# Patient Record
Sex: FEMALE | Race: WHITE
[De-identification: names, ages, dates, MRNs, and addresses within clinical notes are randomized per-mention and may not be internally consistent; named-entity substitution may affect disease eponyms.]

---

## 2017-02-24 ENCOUNTER — HOSPITAL ENCOUNTER (OUTPATIENT)
Dept: HOSPITAL 47 - LABWHC1 | Age: 82
Discharge: HOME | End: 2017-02-24
Payer: MEDICARE

## 2017-02-24 DIAGNOSIS — N39.0: Primary | ICD-10-CM

## 2017-02-24 LAB
PARTICLE COUNT: 1363
PH UR: 5 [PH] (ref 5–8)
RBC UR QL: 1 /HPF (ref 0–5)
SP GR UR: 1.02 (ref 1–1.03)
SQUAMOUS UR QL AUTO: 2 /HPF (ref 0–4)
UA BILLING (MACRO VS. MICRO): (no result)
UROBILINOGEN UR QL STRIP: <2 MG/DL (ref ?–2)
WBC #/AREA URNS HPF: 6 /HPF (ref 0–5)

## 2017-02-24 PROCEDURE — 87086 URINE CULTURE/COLONY COUNT: CPT

## 2017-02-24 PROCEDURE — 81001 URINALYSIS AUTO W/SCOPE: CPT

## 2017-08-17 ENCOUNTER — HOSPITAL ENCOUNTER (INPATIENT)
Dept: HOSPITAL 47 - 3SUR | Age: 82
LOS: 1 days | Discharge: HOME | DRG: 641 | End: 2017-08-18
Payer: MEDICARE

## 2017-08-17 VITALS — BODY MASS INDEX: 26.2 KG/M2

## 2017-08-17 DIAGNOSIS — E11.9: ICD-10-CM

## 2017-08-17 DIAGNOSIS — Z98.49: ICD-10-CM

## 2017-08-17 DIAGNOSIS — K57.90: ICD-10-CM

## 2017-08-17 DIAGNOSIS — Z95.5: ICD-10-CM

## 2017-08-17 DIAGNOSIS — I10: ICD-10-CM

## 2017-08-17 DIAGNOSIS — Z82.49: ICD-10-CM

## 2017-08-17 DIAGNOSIS — F02.80: ICD-10-CM

## 2017-08-17 DIAGNOSIS — R15.9: ICD-10-CM

## 2017-08-17 DIAGNOSIS — K58.0: ICD-10-CM

## 2017-08-17 DIAGNOSIS — J45.909: ICD-10-CM

## 2017-08-17 DIAGNOSIS — Z79.02: ICD-10-CM

## 2017-08-17 DIAGNOSIS — Z79.899: ICD-10-CM

## 2017-08-17 DIAGNOSIS — Z79.1: ICD-10-CM

## 2017-08-17 DIAGNOSIS — E03.9: ICD-10-CM

## 2017-08-17 DIAGNOSIS — R29.6: ICD-10-CM

## 2017-08-17 DIAGNOSIS — K44.9: ICD-10-CM

## 2017-08-17 DIAGNOSIS — Z88.5: ICD-10-CM

## 2017-08-17 DIAGNOSIS — R10.9: ICD-10-CM

## 2017-08-17 DIAGNOSIS — I25.10: ICD-10-CM

## 2017-08-17 DIAGNOSIS — M19.90: ICD-10-CM

## 2017-08-17 DIAGNOSIS — G30.9: ICD-10-CM

## 2017-08-17 DIAGNOSIS — E86.0: Primary | ICD-10-CM

## 2017-08-17 DIAGNOSIS — Z91.81: ICD-10-CM

## 2017-08-17 DIAGNOSIS — Z79.82: ICD-10-CM

## 2017-08-17 DIAGNOSIS — Z82.5: ICD-10-CM

## 2017-08-17 DIAGNOSIS — Z88.0: ICD-10-CM

## 2017-08-17 DIAGNOSIS — E78.00: ICD-10-CM

## 2017-08-17 PROCEDURE — 85379 FIBRIN DEGRADATION QUANT: CPT

## 2017-08-17 PROCEDURE — 85025 COMPLETE CBC W/AUTO DIFF WBC: CPT

## 2017-08-17 PROCEDURE — 76700 US EXAM ABDOM COMPLETE: CPT

## 2017-08-17 PROCEDURE — 84484 ASSAY OF TROPONIN QUANT: CPT

## 2017-08-17 PROCEDURE — 83690 ASSAY OF LIPASE: CPT

## 2017-08-17 PROCEDURE — 93005 ELECTROCARDIOGRAM TRACING: CPT

## 2017-08-17 PROCEDURE — 74160 CT ABDOMEN W/CONTRAST: CPT

## 2017-08-17 PROCEDURE — 83880 ASSAY OF NATRIURETIC PEPTIDE: CPT

## 2017-08-17 PROCEDURE — 71020: CPT

## 2017-08-17 PROCEDURE — 80053 COMPREHEN METABOLIC PANEL: CPT

## 2017-08-17 PROCEDURE — 70450 CT HEAD/BRAIN W/O DYE: CPT

## 2017-08-17 RX ADMIN — METOPROLOL TARTRATE SCH MG: 25 TABLET, FILM COATED ORAL at 20:52

## 2017-08-17 RX ADMIN — LISINOPRIL SCH MG: 10 TABLET ORAL at 20:52

## 2017-08-17 RX ADMIN — MEMANTINE HYDROCHLORIDE SCH MG: 10 TABLET ORAL at 20:52

## 2017-08-17 NOTE — XR
EXAMINATION TYPE: XR chest 2V

 

DATE OF EXAM: 8/17/2017

 

COMPARISON: Prior chest x-ray 12/26/2016

 

HISTORY: Dyspnea

 

TECHNIQUE:  Frontal and lateral views of the chest are obtained.

 

FINDINGS:  There is no focal air space opacity, pleural effusion, or pneumothorax seen.  The cardiac 
silhouette size is stable, heart is enlarged. The patient is rotated.  Prominent lung volumes may be 
indicative of COPD. Probable coronary artery calcification or stent noted. The osseous structures are
 intact.

 

IMPRESSION:  No acute cardiopulmonary process. Stable cardiomegaly.

## 2017-08-17 NOTE — US
EXAMINATION TYPE: US abdomen complete

 

DATE OF EXAM: 8/17/2017

 

COMPARISON: NONE

 

CLINICAL HISTORY: abdominal pain.

 

EXAM MEASUREMENTS:

 

Liver Length:  13.4 cm   

Gallbladder Wall:  0.1 cm   

CBD:  0.6 cm

Spleen:  9.3 cm   

Right Kidney:  10.5 x 4.2 x 3.3 cm 

Left Kidney:  10.4 x 5.2 x 4.9 cm   

 

 

 

Pancreas:  wnl tail obscured by bowel gas

Liver:  partially Obscured by overlying bowel gas  

Gallbladder:  wnl

**Evidence for sonographic Hastings's sign:  no

CBD: Within normal limits of this age group

Spleen:  wnl   

Right Kidney:  wnl   

Left Kidney:  mid pole cyst =0.9 x 1.0 x 0.8 cm  and the kidneys show no hydronephrosis, cortical med
ullary differentiation is maintained.

Upper IVC:  wnl  as visualized

Abd Aorta:  wnl as visualized

 

There is no ascites.

 

 

IMPRESSION: Partially limited exam. No significant abnormalities evident.

## 2017-08-18 VITALS — SYSTOLIC BLOOD PRESSURE: 143 MMHG | DIASTOLIC BLOOD PRESSURE: 82 MMHG | HEART RATE: 97 BPM | TEMPERATURE: 98.4 F

## 2017-08-18 VITALS — RESPIRATION RATE: 12 BRPM

## 2017-08-18 LAB
ALP SERPL-CCNC: 108 U/L (ref 38–126)
ALT SERPL-CCNC: 22 U/L (ref 9–52)
ANION GAP SERPL CALC-SCNC: 10 MMOL/L
AST SERPL-CCNC: 26 U/L (ref 14–36)
BASOPHILS # BLD AUTO: 0 K/UL (ref 0–0.2)
BASOPHILS NFR BLD AUTO: 0 %
BUN SERPL-SCNC: 19 MG/DL (ref 7–17)
CALCIUM SPEC-MCNC: 8.3 MG/DL (ref 8.4–10.2)
CH: 27.6
CHCM: 32.8
CHLORIDE SERPL-SCNC: 113 MMOL/L (ref 98–107)
CO2 SERPL-SCNC: 23 MMOL/L (ref 22–30)
EOSINOPHIL # BLD AUTO: 0.2 K/UL (ref 0–0.7)
EOSINOPHIL NFR BLD AUTO: 3 %
ERYTHROCYTE [DISTWIDTH] IN BLOOD BY AUTOMATED COUNT: 4.68 M/UL (ref 3.8–5.4)
ERYTHROCYTE [DISTWIDTH] IN BLOOD: 13.6 % (ref 11.5–15.5)
GLUCOSE BLD-MCNC: 112 MG/DL (ref 75–99)
GLUCOSE BLD-MCNC: 145 MG/DL (ref 75–99)
GLUCOSE BLD-MCNC: 85 MG/DL (ref 75–99)
GLUCOSE SERPL-MCNC: 81 MG/DL (ref 74–99)
HCT VFR BLD AUTO: 39.5 % (ref 34–46)
HDW: 2.43
HGB BLD-MCNC: 13.7 GM/DL (ref 11.4–16)
LUC NFR BLD AUTO: 2 %
LYMPHOCYTES # SPEC AUTO: 3 K/UL (ref 1–4.8)
LYMPHOCYTES NFR SPEC AUTO: 34 %
MCH RBC QN AUTO: 29.2 PG (ref 25–35)
MCHC RBC AUTO-ENTMCNC: 34.6 G/DL (ref 31–37)
MCV RBC AUTO: 84.4 FL (ref 80–100)
MONOCYTES # BLD AUTO: 0.5 K/UL (ref 0–1)
MONOCYTES NFR BLD AUTO: 5 %
NEUTROPHILS # BLD AUTO: 4.9 K/UL (ref 1.3–7.7)
NEUTROPHILS NFR BLD AUTO: 56 %
NON-AFRICAN AMERICAN GFR(MDRD): 51
POTASSIUM SERPL-SCNC: 4.2 MMOL/L (ref 3.5–5.1)
PROT SERPL-MCNC: 5.8 G/DL (ref 6.3–8.2)
SODIUM SERPL-SCNC: 146 MMOL/L (ref 137–145)
WBC # BLD AUTO: 0.14 10*3/UL
WBC # BLD AUTO: 8.7 K/UL (ref 3.8–10.6)
WBC (PEROX): 8.56

## 2017-08-18 RX ADMIN — LISINOPRIL SCH MG: 10 TABLET ORAL at 07:04

## 2017-08-18 RX ADMIN — MEMANTINE HYDROCHLORIDE SCH MG: 10 TABLET ORAL at 07:05

## 2017-08-18 RX ADMIN — METOPROLOL TARTRATE SCH MG: 25 TABLET, FILM COATED ORAL at 07:05

## 2017-08-18 NOTE — P.GSCN
History of Present Illness


Consult date: 17


Reason for Consult: 





Abdominal pain


History of present illness: 





81-year-old female is being seen for a surgical evaluation at the request of 

the attending for a chief complaint of diffuse abdominal pain with frequent 

stooling.  Patient is a poor past medical historian.  Has no  medical history 

recall is not able to provide any medical history or give information about 

current event patient does have a history of Alzheimer with dementia.  Health 

history has been obtained by the 2 daughters at the bedside.  The 2 daughters 

state the last 5 weeks they have been staying with her mother the house while 

her father recuperates from hernia repair.  The 2 daughters state that they 

have noticed over the last several weeks the patient would be incontinent of 

stool especially at night.  They state that she may have several loose watery 

stools.  No blood noted in the stool   


Patient currently is resting comfortably in bed and palpating the abdominal 

wall does not elicit any facial grimacing.  Questioning patient if patient is 

having any abdominal pain the patient stating no


  According to the daughter patient has had a poor appetite for the past 

several weeks   not aware of the patient's past surgical history or medical 

history   when questioning the daughters also state  not certain if the mother 

has ever had a colonoscopy.  Reviewing the prior computerized record in 

2016 past surgical history indicated patient has had cataract surgery, 

colonoscopy, tonsillectomy.  Reviewing the computerized record in 2014 patient did have a heart catheterization involving a single-vessel the LAD 

done by Dr. Mitchell





Review of Systems





Not able to adequately obtain





Past Medical History


Past Medical History: Dementia, Diabetes Mellitus, Hypertension, Memory 

Impairment, Osteoarthritis (OA)


History of Any Multi-Drug Resistant Organisms: None Reported


Past Surgical History: Tonsillectomy


Additional Past Surgical History / Comment(s): cataract surgery, colonoscopy, 

heart stent 


Past Anesthesia/Blood Transfusion Reactions: No Reported Reaction


Past Psychological History: No Psychological Hx Reported


Smoking Status: Never smoker


Past Alcohol Use History: Rare


Past Drug Use History: None Reported





- Past Family History


  ** Mother


Family Medical History: Asthma





  ** Father


Family Medical History: Myocardial Infarction (MI)


Additional Family Medical History / Comment(s):  at 59





Medications and Allergies


 Home Medications











 Medication  Instructions  Recorded  Confirmed  Type


 


Donepezil [Aricept] 10 mg PO HS 14 History


 


Focus-Macular 1 tab PO BID 14 History


 


Levothyroxine Sodium [Synthroid] 25 mcg PO DAILY 14 History


 


Aspirin EC [Ecotrin Low Dose] 81 mg PO DAILY 16 History


 


Cholecalciferol [Vitamin D3] 2,000 unit PO DAILY 16 History


 


Diclofenac Sodium [Voltaren] 50 mg PO BID 16 History


 


Memantine [Namenda] 10 mg PO BID 16 History


 


Montelukast [Singulair] 10 mg PO DAILY 16 History


 


Multivitamin [Multivitamins Adult 2 tab PO DAILY 17 History





Gummies]    











 Allergies











Allergy/AdvReac Type Severity Reaction Status Date / Time


 


Penicillins Allergy  Unknown Verified 16 14:35


 


propoxyphene HCl Allergy  Confusion Verified 16 14:35





[From Darvon]     














Surgical - Exam


 Vital Signs











Temp Pulse Resp BP Pulse Ox


 


 97 F L  75   16   197/81   95 


 


 17 12:06  17 12:06  17 12:06  17 12:06  17 12:06














GENERAL APPEARANCE: 81-year-old female patient is alert, oriented to self and 

place able to identify daughter's in no acute distress.  Pleasant cooperative 


VITAL SIGNS: Reviewed 


HEENT: Head is normocephalic and atraumatic. Pupils are equal and reactive. The 

nares are patent. Oropharynx is clear without lesions.


NECK: Supple without lymphadenopathy. Traches midline.


HEART: S1, S2. Regular rate and rhythm.  Denying chest pain no murmur


LUNGS: No crackles or wheezes are heard.  Adequate air movement bilaterally


ABDOMEN: Soft, nontender, nondistended with good bowel sounds. No peritoneal 

signs. No palpable organomegaly or masses.  


EXTREMITIES: Normal skin color and turgor. No cyanosis, rash, ulceration, 

clubbing or edema. Radial pedal pulses are 2/4 bilaterally.











Results





- Labs





 17 06:03





 17 06:03


 Abnormal Lab Results - Last 24 Hours (Table)











  17 Range/Units





  06:03 12:18 


 


Sodium  146 H   (137-145)  mmol/L


 


Chloride  113 H   ()  mmol/L


 


BUN  19 H   (7-17)  mg/dL


 


POC Glucose (mg/dL)   145 H  (75-99)  mg/dL


 


Calcium  8.3 L   (8.4-10.2)  mg/dL


 


Total Protein  5.8 L   (6.3-8.2)  g/dL


 


Albumin  3.3 L   (3.5-5.0)  g/dL








 Diabetes panel











  17 Range/Units





  06:03 


 


Sodium  146 H  (137-145)  mmol/L


 


Potassium  4.2  (3.5-5.1)  mmol/L


 


Chloride  113 H  ()  mmol/L


 


Carbon Dioxide  23  (22-30)  mmol/L


 


BUN  19 H  (7-17)  mg/dL


 


Creatinine  1.04  (0.52-1.04)  mg/dL


 


Glucose  81  (74-99)  mg/dL


 


Calcium  8.3 L  (8.4-10.2)  mg/dL


 


AST  26  (14-36)  U/L


 


ALT  22  (9-52)  U/L


 


Alkaline Phosphatase  108  ()  U/L


 


Total Protein  5.8 L  (6.3-8.2)  g/dL


 


Albumin  3.3 L  (3.5-5.0)  g/dL








 Calcium panel











  17 Range/Units





  06:03 


 


Calcium  8.3 L  (8.4-10.2)  mg/dL


 


Albumin  3.3 L  (3.5-5.0)  g/dL








 Pituitary panel











  17 Range/Units





  06:03 


 


Sodium  146 H  (137-145)  mmol/L


 


Potassium  4.2  (3.5-5.1)  mmol/L


 


Chloride  113 H  ()  mmol/L


 


Carbon Dioxide  23  (22-30)  mmol/L


 


BUN  19 H  (7-17)  mg/dL


 


Creatinine  1.04  (0.52-1.04)  mg/dL


 


Glucose  81  (74-99)  mg/dL


 


Calcium  8.3 L  (8.4-10.2)  mg/dL








 Adrenal panel











  17 Range/Units





  06:03 


 


Sodium  146 H  (137-145)  mmol/L


 


Potassium  4.2  (3.5-5.1)  mmol/L


 


Chloride  113 H  ()  mmol/L


 


Carbon Dioxide  23  (22-30)  mmol/L


 


BUN  19 H  (7-17)  mg/dL


 


Creatinine  1.04  (0.52-1.04)  mg/dL


 


Glucose  81  (74-99)  mg/dL


 


Calcium  8.3 L  (8.4-10.2)  mg/dL


 


Total Bilirubin  0.6  (0.2-1.3)  mg/dL


 


AST  26  (14-36)  U/L


 


ALT  22  (9-52)  U/L


 


Alkaline Phosphatase  108  ()  U/L


 


Total Protein  5.8 L  (6.3-8.2)  g/dL


 


Albumin  3.3 L  (3.5-5.0)  g/dL














Assessment and Plan


Plan: 





Impression


Present on admission abdominal pain unclear etiology


Alzheimer's with dementia no behavior disturbance


Coronary artery disease with prior coronary stenting LAD 


History of frequent falls


CAT scan abdomen and pelvis done on  shows hiatal hernia 

diverticulosis with no evidence of diverticulitis


Present on admission a history of frequent loose stools possible irritable 

bowel syndrome


 hypertension








Plan


Check stool studies


No evidence of an acute abdomen


Dietary to instruct patient on a diverticulosis diet


Resume home meds as appropriate 


further surgical recommendations pending


DVT and GI prophylaxis


May benefit from a colonoscopy











The above impression and plan of care have been discussed and directed by 

signing physician. Susan Malhotra nurse practitioner acting as scribe for signing 

physician.

## 2017-08-18 NOTE — CONS
Mrs. Lali Zheng is a 81-year-old lady with a known diagnosis of CAD, 
previous LAD stenting performed by Dr. ZOILA Mitchell as well as dilatation of 
diagonal branch in 2014.  She has hypertension, hypothyroidism, bronchial 
asthma.  She was admitted to the hospital by Dr. Geremias Quiñones with complaints 
of diarrhea that was persistent and dehydration.  While she was here she became 
hypertensive and I was asked to see her in this regard.  I saw the patient and 
also talked to the family.  The patient at this time is in no distress, she is 
comfortable.  Denies chest pain.  Diarrhea has resolved.  She has no shortness 
of breath.  She is resting comfortably.  There is no EKG in the chart and I 
requested for an EKG to performed.  Her blood pressure which was elevated has 
come back to normal once her medications were resumed.  



She has a history of hypertension, bronchial asthma, hyperlipidemia, previous 
PCI.  



Her laboratory data suggests that her initial troponin was normal.  Her renal 
function is normal and electrolyte profile and hemoglobin are acceptable.  
Platelet count is 158.



PAST MEDICAL HISTORY: 

1.  Hypertension.

2.  Hypercholesterolemia.

3.  CAD with previous LAD stenting in 2014.

4.  History of nondescript abdominal pain on and off.

5.  History of bronchial asthma.



MEDICATIONS AT HOME:  Include Singulair, Lopressor, Namenda, Synthroid, Vasotec
, Plavix, aspirin 81 mg daily, atorvastatin.



ALLERGIES:  PENICILLIN AND DARVON.



On examination, blood pressure is 144/70, pulse rate is 68 per minute and 
regular.

HEENT:  Unremarkable.  Fundus was not examined by me.

NECK:  Supple.  There is no JVD.  I do not hear a carotid bruit.

HEART:  Reveals S1/S2 heard normally.  There is a soft systolic murmur left 
lower sternal border.

LUNGS:  Clear.

ABDOMEN:  Soft, nontender.

LOWER EXTREMITIES:  Reveal normal pulses.  No edema.

CENTRAL NERVOUS SYSTEM:  Normal.



EKG is pending.  



IMPRESSION:

1.  Patient is here with diarrhea but the diarrhea has resolved.  Electrolytes 
look quite alright.  There is no reflexion of any electrolyte imbalance at this 
time.

2.  Stable coronary artery disease with previous stenting.  No evidence of any 
angina.

3.  Hypertension which was not well controlled yesterday, appears to be under 
good control with resumption of medications.

4.  Bronchial asthma.

5.  Hypothyroidism.



RECOMMENDATIONS:  I am recommending that her current medical regimen is good. 
We can continue the same medications and she can be discharged whenever it is 
okay with Dr. Geremias Quiñones.  Her BP has been optimized.  Her medications 
including beta blocker and lisinopril have been resumed.  I am recommending 
that we discontinue Plavix and continue aspirin 81 mg daily and she can then be 
discharged.  I discussed my thoughts in detail with the patient.  



Thank you very much for the consult.
BERT

## 2017-08-18 NOTE — CT
EXAMINATION TYPE: CT abdomen w con

 

DATE OF EXAM: 8/18/2017

 

COMPARISON: NONE

 

INDICATION: Patient poor historian.  Patient has abdominal pain.

 

DLP: 398.8 mGycm, Automated exposure control for dose reduction was used.

 

CONTRAST:  80 mL of Visipaque 320. 

                        Study performed without Oral Contrast

 

TECHNIQUE: Axial images were obtained from above the diaphragm to the pubic rami in the axial plane a
t 5 mm thick sections.  Reconstructed images are reviewed on the computer in the coronal plane.  

 

FINDINGS:

 

Limited CT sections are obtained the lung bases.  Minimal areas of pneumonitis are at the lung bases 
which can be related to subsegmental atelectasis. Hiatal hernia is present. Some coronary artery calc
ification is not excluded..

 

CT ABDOMEN:

 

Liver: Normal

 

Spleen: Normal

 

Pancreas: Normal

 

Adrenal glands: The adrenal glands are normal.

 

Gallbladder: Normal  

 

Kidneys: No masses are evident. No hydronephrosis is present.   No cysts are present.  Delayed images
 were obtained through the kidneys, which remain unremarkable.

 

Aorta: Vascular calcification is within the aorta. 

 

Inferior vena cava: Normal.

 

Loops of bowel within the abdomen and pelvis are normal.     Studies without oral contrast limiting t
heir evaluation. Some distal descending colon diverticular changes are evident. Few scattered diverti
culi are within the transverse and ascending colon within the field-of-view.

 

IMPRESSIONS:

1.  Hiatal hernia.

2. Diverticulosis without acute diverticulitis within the visualized colon.

## 2017-08-18 NOTE — CT
EXAMINATION TYPE: CT brain wo con

 

DATE OF EXAM: 8/18/2017

 

COMPARISON: 12/26/2016

 

INDICATION: Patient poor historian.  History of dementia.  Rule out CVA.

 

DLP: 742.7 mGycm, Automated exposure control for dose reduction was used.

 

CONTRAST: None

 

CT of the brain is performed utilizing 3 mm thick sections through the posterior fossa and 3 mm thick
 sections through the remaining calvarium.  Study is performed within 24 hours of arrival to the hosp
ital. 

 

No abnormal hyperdensity is present to suggest an acute intracranial hemorrhage.

No mass lesion is evident.

No acute infarcts are evident. There is mild periventricular white matter hypodensity, likely on the 
basis of chronic white matter ischemic changes.

Ventricles and sulci are prominent for the patient age.  

Paranasal sinuses and mastoid air cells within the field-of-view are clear.

 

IMPRESSIONS:

1.   Atrophy with mild periventricular white matter ischemic changes, stable from December 2016

## 2017-09-14 ENCOUNTER — HOSPITAL ENCOUNTER (OUTPATIENT)
Dept: HOSPITAL 47 - ORWHC2ENDO | Age: 82
Discharge: HOME | End: 2017-09-14
Attending: SURGERY
Payer: MEDICARE

## 2017-09-14 VITALS — TEMPERATURE: 98 F

## 2017-09-14 VITALS — DIASTOLIC BLOOD PRESSURE: 74 MMHG | SYSTOLIC BLOOD PRESSURE: 126 MMHG | HEART RATE: 75 BPM

## 2017-09-14 VITALS — BODY MASS INDEX: 26.2 KG/M2

## 2017-09-14 VITALS — RESPIRATION RATE: 16 BRPM

## 2017-09-14 DIAGNOSIS — G30.9: ICD-10-CM

## 2017-09-14 DIAGNOSIS — M19.90: ICD-10-CM

## 2017-09-14 DIAGNOSIS — K20.9: ICD-10-CM

## 2017-09-14 DIAGNOSIS — F02.80: ICD-10-CM

## 2017-09-14 DIAGNOSIS — Z79.899: ICD-10-CM

## 2017-09-14 DIAGNOSIS — E07.9: ICD-10-CM

## 2017-09-14 DIAGNOSIS — Z79.82: ICD-10-CM

## 2017-09-14 DIAGNOSIS — I25.10: ICD-10-CM

## 2017-09-14 DIAGNOSIS — K57.30: ICD-10-CM

## 2017-09-14 DIAGNOSIS — K29.50: Primary | ICD-10-CM

## 2017-09-14 DIAGNOSIS — Z79.02: ICD-10-CM

## 2017-09-14 DIAGNOSIS — Z95.5: ICD-10-CM

## 2017-09-14 DIAGNOSIS — E78.5: ICD-10-CM

## 2017-09-14 DIAGNOSIS — I10: ICD-10-CM

## 2017-09-14 DIAGNOSIS — E11.9: ICD-10-CM

## 2017-09-14 DIAGNOSIS — K44.9: ICD-10-CM

## 2017-09-14 DIAGNOSIS — Z88.0: ICD-10-CM

## 2017-09-14 DIAGNOSIS — Z88.8: ICD-10-CM

## 2017-09-14 LAB — GLUCOSE BLD-MCNC: 109 MG/DL (ref 75–99)

## 2017-09-14 PROCEDURE — 88305 TISSUE EXAM BY PATHOLOGIST: CPT

## 2017-09-14 PROCEDURE — 43239 EGD BIOPSY SINGLE/MULTIPLE: CPT

## 2017-09-14 PROCEDURE — 88342 IMHCHEM/IMCYTCHM 1ST ANTB: CPT

## 2017-09-14 PROCEDURE — 45378 DIAGNOSTIC COLONOSCOPY: CPT

## 2017-09-14 NOTE — P.OP
Date of Procedure: 09/14/17


Preoperative Diagnosis: 


GERD





Diverticulitis


Postoperative Diagnosis: 


Antral gastritis





Large hiatal hernia





Esophagitis





Severe diverticulosis of sigmoid left colon


Procedure(s) Performed: 


EGD and colonoscopy


Anesthesia: MAC


Surgeon: Juan Wood


Pathology: other (Antrum, esophagus)


Condition: stable


Disposition: PACU


Description of Procedure: 


The patient's placed on the endoscopy table in the lateral position.  She 

received IV sedation.  The gastroscope placed oropharynx and passed in the 

esophagus and into the stomach.  The scope was then placed through the pylorus.

  The first second portion of the duodenum appeared normal.  The scope was then 

brought back the antrum is appeared mildly inflamed.  A biopsies performed.  

The scope was then retroflexed and the remainder of the stomach appeared 

normal.  There was a large hiatal hernia seen.  The GE junction was at 37 cm.  

The distal esophagus was mildly inflamed and a biopsies performed.  The 

proximal esophagus.  Normal.  The scope was withdrawn for patient.





Next digital rectal exam was performed which revealed no abnormalities.  The 

flexible colonoscope was then placed patient anus passed throughout the entire 

colon.  The ileocecal valve sutures.  The cecum, ascending and transverse colon 

appeared normal.  In the descending; there is severe diverticulosis.  There is 

known to back to diverticulitis.  The scope was then brought back the rectum 

and this appeared normal.  Scope was withdrawn for patient.

## 2017-09-14 NOTE — P.GSHP
History of Present Illness


H&P Date: 17


Chief Complaint: GERD, dysphagia, diverticulitis





This is a 81-year-old female who's had complaints of abdominal pain.  Her 

recent CAT scan shows evidence of a hiatal hernia and diverticulitis.  She 

presents today for colonoscopy.





Past Medical History


Past Medical History: Dementia, Diabetes Mellitus, Hyperlipidemia, Hypertension

, Memory Impairment, Osteoarthritis (OA), Thyroid Disorder


Additional Past Medical History / Comment(s): ALZHEIMER'S


History of Any Multi-Drug Resistant Organisms: None Reported


Past Surgical History: Heart Catheterization With Stent, Tonsillectomy


Additional Past Surgical History / Comment(s): cataract surgery, colonoscopy,


Past Anesthesia/Blood Transfusion Reactions: No Reported Reaction


Date of Last Stent Placement:: 


Smoking Status: Never smoker





- Past Family History


  ** Mother


Family Medical History: Asthma





  ** Father


Family Medical History: Deep Vein Thrombosis (DVT), Myocardial Infarction (MI)


Additional Family Medical History / Comment(s):  at 59





  ** Sister(s)


Family Medical History: Cancer


Additional Family Medical History / Comment(s): 2 SISTERS HAD CANCER





Medications and Allergies


 Home Medications











 Medication  Instructions  Recorded  Confirmed  Type


 


Donepezil [Aricept] 10 mg PO HS 14 History


 


Focus-Macular 1 tab PO BID 14 History


 


Levothyroxine Sodium [Synthroid] 25 mcg PO DAILY 14 History


 


Atorvastatin [Lipitor] 80 mg PO HS #30 tab 14 Rx


 


Clopidogrel [Plavix] 75 mg PO DAILY #30 tab 14 Rx


 


Enalapril [Vasotec] 5 mg PO BID #60 tab 14 Rx


 


Metoprolol Tartrate [Lopressor] 25 mg PO BID #60 tab 14 Rx


 


Nitroglycerin Sl Tabs [Nitrostat] 0.4 mg SUBLINGUAL Q5M PRN #25 tab 14 Rx


 


Aspirin EC [Ecotrin Low Dose] 81 mg PO DAILY 16 History


 


Cholecalciferol [Vitamin D3] 2,000 unit PO DAILY 16 History


 


Diclofenac Sodium [Voltaren] 50 mg PO BID 16 History


 


Montelukast [Singulair] 10 mg PO DAILY 16 History


 


Multivitamin [Multivitamins Adult 2 tab PO DAILY 17 History





Gummies]    











 Allergies











Allergy/AdvReac Type Severity Reaction Status Date / Time


 


Penicillins Allergy  Unknown Verified 17 08:22


 


propoxyphene HCl Allergy  Confusion Verified 17 08:22





[From Huong]     














Surgical - Exam


 Vital Signs











Temp Pulse Resp BP Pulse Ox


 


 98.0 F   56 L  16   158/58   99 


 


 17 08:48  17 08:48  17 08:48  17 08:48  17 08:48














- General


well developed, no distress





- Eyes


PERRL





- ENT


normal pinna





- Neck


no masses





- Respiratory


normal expansion





- Cardiovascular


Rhythm: regular





- Abdomen


Abdomen: soft, non tender





Assessment and Plan


Plan: 





History of.  Xylocaine diverticulitis.  We'll perform EGD and colonoscopy.

## 2017-10-06 ENCOUNTER — HOSPITAL ENCOUNTER (OUTPATIENT)
Dept: HOSPITAL 47 - RADMAMWWP | Age: 82
Discharge: HOME | End: 2017-10-06
Payer: MEDICARE

## 2017-10-06 DIAGNOSIS — Z12.31: Primary | ICD-10-CM

## 2017-10-09 NOTE — HP
HISTORY AND PHYSICAL



CHIEF COMPLAINT:

81-year-old, white female, history of coronary artery disease.  Previous LAD stenting

with hypertension, hypothyroidism, asthma.  She had diarrhea significant nature

persistent.  She was dehydrated and became hypertensive with acceleration and severe

dehydration.  At which time she was admitted to the hospital.  She has a history of

bronchial asthma,  dyslipidemia,  hypertension, coronary artery disease with stenting

in 2014, bronchial asthma.



HOME MEDICINES:

1. Singular.

2. Lopressor.

3. Namenda.

4. Synthroid.

5. Vasotec.

6. Plavix.

7. Aspirin.

8. Atorvastatin.



ALLERGIES:

PENICILLIN AND DARVON.



PHYSICAL EXAMINATION:

Blood pressure 144/70, pulse is 68 and regular.  HEENT:  Normocephalic, atraumatic.

Neck is supple.  No JVD.  Heart are S1, S2.  Lungs are clear.  Abdomen is soft,

nontender.  Extremities no cyanosis, clubbing, or edema.



EKG is pending.



ASSESSMENT:

1. Dehydration, diarrhea.

2. Dizziness secondary to dehydration.

3. Stable coronary artery disease with stents.

4. Hypertension.

5. Bronchial asthma.

6. Hypothyroidism.



PLAN:

IV fluid,  rehydration for dizziness and diarrhea will be assessed by surgeon or

Gastroenterology doctor.  Hypertension acceleration will be treated with beta blockers,

lisinopril and Cardiology consult for hypertension acceleration.





MMODL / IJN: [id number] / Job#: [id number]

## 2017-10-10 NOTE — MM
Reason for exam: screening  (asymptomatic).

Last mammogram was performed 1 year ago.



History:

Patient is postmenopausal.

Family history of breast cancer in aunt and breast cancer in sister at age 25.

2 benign excisional biopsies of the right breast.



Physical Findings:

A clinical breast exam by your physician is recommended on an annual basis and 

results should be correlated with mammographic findings.



MG Screening Mammo w CAD

Bilateral CC and MLO view(s) were taken.

Prior study comparison: September 29, 2016, bilateral MG 3d screening mammo w/cad.

August 20, 2015, bilateral MG screening mammo w CAD.

The breast tissue is almost entirely fat.  Bilateral benign secretory 

calcifications.  No significant changes when compared with prior studies.





ASSESSMENT: Negative, BI-RAD 1



RECOMMENDATION:

Routine screening mammogram of both breasts in 1 year.

## 2019-08-28 ENCOUNTER — HOSPITAL ENCOUNTER (INPATIENT)
Dept: HOSPITAL 47 - EC | Age: 84
LOS: 9 days | Discharge: NURSING, REHAB OR LONG-TERM CARE | DRG: 417 | End: 2019-09-06
Payer: MEDICARE

## 2019-08-28 VITALS — BODY MASS INDEX: 24.9 KG/M2

## 2019-08-28 DIAGNOSIS — D49.0: ICD-10-CM

## 2019-08-28 DIAGNOSIS — E11.65: ICD-10-CM

## 2019-08-28 DIAGNOSIS — Z80.9: ICD-10-CM

## 2019-08-28 DIAGNOSIS — Z95.5: ICD-10-CM

## 2019-08-28 DIAGNOSIS — K44.9: ICD-10-CM

## 2019-08-28 DIAGNOSIS — I21.4: ICD-10-CM

## 2019-08-28 DIAGNOSIS — Z79.899: ICD-10-CM

## 2019-08-28 DIAGNOSIS — K81.2: Primary | ICD-10-CM

## 2019-08-28 DIAGNOSIS — E78.5: ICD-10-CM

## 2019-08-28 DIAGNOSIS — Z82.5: ICD-10-CM

## 2019-08-28 DIAGNOSIS — K82.A2: ICD-10-CM

## 2019-08-28 DIAGNOSIS — Z82.49: ICD-10-CM

## 2019-08-28 DIAGNOSIS — F02.80: ICD-10-CM

## 2019-08-28 DIAGNOSIS — Z83.2: ICD-10-CM

## 2019-08-28 DIAGNOSIS — K82.1: ICD-10-CM

## 2019-08-28 DIAGNOSIS — N39.0: ICD-10-CM

## 2019-08-28 DIAGNOSIS — Z88.5: ICD-10-CM

## 2019-08-28 DIAGNOSIS — K57.90: ICD-10-CM

## 2019-08-28 DIAGNOSIS — I25.10: ICD-10-CM

## 2019-08-28 DIAGNOSIS — G30.9: ICD-10-CM

## 2019-08-28 DIAGNOSIS — B96.20: ICD-10-CM

## 2019-08-28 DIAGNOSIS — E03.9: ICD-10-CM

## 2019-08-28 DIAGNOSIS — I10: ICD-10-CM

## 2019-08-28 DIAGNOSIS — Z79.890: ICD-10-CM

## 2019-08-28 DIAGNOSIS — I25.5: ICD-10-CM

## 2019-08-28 DIAGNOSIS — J98.11: ICD-10-CM

## 2019-08-28 DIAGNOSIS — E87.6: ICD-10-CM

## 2019-08-28 DIAGNOSIS — K66.0: ICD-10-CM

## 2019-08-28 DIAGNOSIS — Z79.82: ICD-10-CM

## 2019-08-28 DIAGNOSIS — Z88.0: ICD-10-CM

## 2019-08-28 DIAGNOSIS — Z98.49: ICD-10-CM

## 2019-08-28 DIAGNOSIS — M19.90: ICD-10-CM

## 2019-08-28 DIAGNOSIS — R13.10: ICD-10-CM

## 2019-08-28 LAB
ALBUMIN SERPL-MCNC: 4.3 G/DL (ref 3.5–5)
ALP SERPL-CCNC: 130 U/L (ref 38–126)
ALT SERPL-CCNC: 25 U/L (ref 9–52)
ANION GAP SERPL CALC-SCNC: 9 MMOL/L
APTT BLD: 21.5 SEC (ref 22–30)
AST SERPL-CCNC: 24 U/L (ref 14–36)
BASOPHILS # BLD AUTO: 0.1 K/UL (ref 0–0.2)
BASOPHILS NFR BLD AUTO: 0 %
BUN SERPL-SCNC: 21 MG/DL (ref 7–17)
CALCIUM SPEC-MCNC: 9.2 MG/DL (ref 8.4–10.2)
CHLORIDE SERPL-SCNC: 106 MMOL/L (ref 98–107)
CO2 SERPL-SCNC: 28 MMOL/L (ref 22–30)
EOSINOPHIL # BLD AUTO: 0.3 K/UL (ref 0–0.7)
EOSINOPHIL NFR BLD AUTO: 1 %
ERYTHROCYTE [DISTWIDTH] IN BLOOD BY AUTOMATED COUNT: 4.89 M/UL (ref 3.8–5.4)
ERYTHROCYTE [DISTWIDTH] IN BLOOD: 16.1 % (ref 11.5–15.5)
GLUCOSE BLD-MCNC: 129 MG/DL (ref 75–99)
GLUCOSE SERPL-MCNC: 157 MG/DL (ref 74–99)
HCT VFR BLD AUTO: 40.9 % (ref 34–46)
HGB BLD-MCNC: 13.1 GM/DL (ref 11.4–16)
INR PPP: 0.9 (ref ?–1.2)
LYMPHOCYTES # SPEC AUTO: 1.9 K/UL (ref 1–4.8)
LYMPHOCYTES NFR SPEC AUTO: 10 %
MAGNESIUM SPEC-SCNC: 2.1 MG/DL (ref 1.6–2.3)
MCH RBC QN AUTO: 26.8 PG (ref 25–35)
MCHC RBC AUTO-ENTMCNC: 32.1 G/DL (ref 31–37)
MCV RBC AUTO: 83.5 FL (ref 80–100)
MONOCYTES # BLD AUTO: 0.4 K/UL (ref 0–1)
MONOCYTES NFR BLD AUTO: 2 %
NEUTROPHILS # BLD AUTO: 15.6 K/UL (ref 1.3–7.7)
NEUTROPHILS NFR BLD AUTO: 85 %
PLATELET # BLD AUTO: 258 K/UL (ref 150–450)
POTASSIUM SERPL-SCNC: 5.3 MMOL/L (ref 3.5–5.1)
PROT SERPL-MCNC: 7.1 G/DL (ref 6.3–8.2)
PT BLD: 10.1 SEC (ref 9–12)
SODIUM SERPL-SCNC: 143 MMOL/L (ref 137–145)
WBC # BLD AUTO: 18.4 K/UL (ref 3.8–10.6)

## 2019-08-28 PROCEDURE — 87324 CLOSTRIDIUM AG IA: CPT

## 2019-08-28 PROCEDURE — 87086 URINE CULTURE/COLONY COUNT: CPT

## 2019-08-28 PROCEDURE — 84100 ASSAY OF PHOSPHORUS: CPT

## 2019-08-28 PROCEDURE — 84478 ASSAY OF TRIGLYCERIDES: CPT

## 2019-08-28 PROCEDURE — 85730 THROMBOPLASTIN TIME PARTIAL: CPT

## 2019-08-28 PROCEDURE — 87077 CULTURE AEROBIC IDENTIFY: CPT

## 2019-08-28 PROCEDURE — 84484 ASSAY OF TROPONIN QUANT: CPT

## 2019-08-28 PROCEDURE — 71250 CT THORAX DX C-: CPT

## 2019-08-28 PROCEDURE — 80048 BASIC METABOLIC PNL TOTAL CA: CPT

## 2019-08-28 PROCEDURE — 85610 PROTHROMBIN TIME: CPT

## 2019-08-28 PROCEDURE — 83735 ASSAY OF MAGNESIUM: CPT

## 2019-08-28 PROCEDURE — 85027 COMPLETE CBC AUTOMATED: CPT

## 2019-08-28 PROCEDURE — 74176 CT ABD & PELVIS W/O CONTRAST: CPT

## 2019-08-28 PROCEDURE — 83690 ASSAY OF LIPASE: CPT

## 2019-08-28 PROCEDURE — 36415 COLL VENOUS BLD VENIPUNCTURE: CPT

## 2019-08-28 PROCEDURE — 76700 US EXAM ABDOM COMPLETE: CPT

## 2019-08-28 PROCEDURE — 93005 ELECTROCARDIOGRAM TRACING: CPT

## 2019-08-28 PROCEDURE — 78226 HEPATOBILIARY SYSTEM IMAGING: CPT

## 2019-08-28 PROCEDURE — 85025 COMPLETE CBC W/AUTO DIFF WBC: CPT

## 2019-08-28 PROCEDURE — 99285 EMERGENCY DEPT VISIT HI MDM: CPT

## 2019-08-28 PROCEDURE — 71046 X-RAY EXAM CHEST 2 VIEWS: CPT

## 2019-08-28 PROCEDURE — 83880 ASSAY OF NATRIURETIC PEPTIDE: CPT

## 2019-08-28 PROCEDURE — 93306 TTE W/DOPPLER COMPLETE: CPT

## 2019-08-28 PROCEDURE — 80053 COMPREHEN METABOLIC PANEL: CPT

## 2019-08-28 PROCEDURE — 83036 HEMOGLOBIN GLYCOSYLATED A1C: CPT

## 2019-08-28 PROCEDURE — 88304 TISSUE EXAM BY PATHOLOGIST: CPT

## 2019-08-28 PROCEDURE — 82330 ASSAY OF CALCIUM: CPT

## 2019-08-28 PROCEDURE — 87186 SC STD MICRODIL/AGAR DIL: CPT

## 2019-08-28 PROCEDURE — 74183 MRI ABD W/O CNTR FLWD CNTR: CPT

## 2019-08-28 PROCEDURE — 81001 URINALYSIS AUTO W/SCOPE: CPT

## 2019-08-28 RX ADMIN — MEMANTINE HYDROCHLORIDE SCH MG: 10 TABLET ORAL at 20:12

## 2019-08-28 RX ADMIN — METOPROLOL TARTRATE SCH MG: 25 TABLET, FILM COATED ORAL at 22:59

## 2019-08-28 RX ADMIN — LISINOPRIL SCH MG: 10 TABLET ORAL at 20:12

## 2019-08-28 RX ADMIN — ATORVASTATIN CALCIUM SCH: 80 TABLET, FILM COATED ORAL at 20:12

## 2019-08-28 NOTE — CT
EXAMINATION TYPE: CT ChestAbdPelvis wo con

 

DATE OF EXAM: 8/28/2019

 

INDICATION: Chest and abdominal pain, nausea and vomiting.

 

COMPARISON: CT abdomen 8/18/2017

 

CT DLP: 590.5 mGycm

 

CONTRAST: 

Performed without Oral Contrast

 

TECHNIQUE: Axial images at 5 mm thick sections.  Reconstructed images in the coronal plane.  Delayed 
images through the kidneys.  

 

FINDINGS:

 

CT CHEST:

 

Portion of the thyroid visualized is normal.

 

No suspicious lung nodules or focal infiltrates are present.

 

No enlarged mediastinal or hilar adenopathy is evident. 

 

The ascending aorta diameter at the level of the main pulmonary artery is 3.0 cm.  The main pulmonary
 artery diameter at the bifurcation is 2.5 cm. Some coronary artery calcifications present. Moderate 
size hiatal hernia is present.

 

CT ABDOMEN:

 

Liver: Normal

 

Spleen: Normal

 

Pancreas: There is some ill-defined hypodensity within the anterior head of the pancreas. Potentially
 this could be dilated duct low density somewhat greater than expected. Underlying mass should be con
sidered. Recommend ultrasound pancreas for additional evaluation.

 

Adrenal glands: The adrenal glands are normal.

 

Gallbladder: Normal  

 

Kidneys: No masses are evident. No hydronephrosis is present.   No cysts are present.  No renal stone
s are evident.

 

Aorta: Vascular calcification is within the aorta. 

 

Inferior vena cava: Normal.

 

CT PELVIS: 

Loops of bowel within the abdomen and pelvis are normal.     Scattered diverticuli within the sigmoid
 colon.

 

Appendix: Normal as visualized.

 

Urinary bladder: Decompressed with limited evaluation. 

 

Genitourinary structures: Uterus appears normal. Adnexal regions appear normal. There is a large calc
ification on the right adnexal region could be related to vascular calcification. Typical appearance 
for dermoid is not present.

 

Osseous structures: No suspicious lytic or sclerotic lesions. Facet degenerative changes are present.


 

IMPRESSIONS:

1. Moderate-sized hiatal hernia.

2. Ill-defined hypodensity within the head of the pancreas measuring 3.5 x 1.2 cm. Additional evaluat
ion for possible neoplasm with ultrasound is recommended. Differential diagnosis could include pancre
atitis.

3. Diverticulosis without acute diverticulitis within the sigmoid colon.

4. Large calcification right adnexal region is nonspecific.

## 2019-08-28 NOTE — XR
EXAMINATION TYPE: XR chest 2V

 

DATE OF EXAM: 8/28/2019

 

COMPARISON: 8/17/2017

 

HISTORY: 83-year-old female with chest pain

 

TECHNIQUE:  AP and lateral views

 

FINDINGS:  

Heart is mildly enlarged. Diffuse interstitial prominence. No consolidation or pleural effusion.

 

 

IMPRESSION:  

Mild cardiomegaly. Diffuse interstitial densities likely in part chronic. Correlate for possible mild
 pulmonary vascular congestion.

## 2019-08-28 NOTE — ED
General Adult HPI





- General


Chief complaint: Chest Pain


Stated complaint: chest pain


Time Seen by Provider: 19 12:30


Source: family, RN notes reviewed


Mode of arrival: wheelchair


Limitations: altered mental status





- History of Present Illness


Initial comments: 





This is an 83-year-old female who is brought into the emergency department by 

her .  Patient is unable to communicate however  somehow believes 

the patient is having chest pain though she is unable to tell us.   

states she was putting her hands on her chest or upper belly earlier today and 

then vomited times one he gave her 3 nitroglycerin at home.  Patient still was 

occasionally putting her hands on her upper belly and chest and so he determined

that she was having chest pain and decided to bring the patient to the emergency

department.  Patient cannot give any history.   states the patient has 

had no recent fevers she had no time appeared to be short of breath.  The 

patient had only 1 of vomiting and no episodes of diarrhea.





- Related Data


                                Home Medications











 Medication  Instructions  Recorded  Confirmed


 


Donepezil [Aricept] 10 mg PO DAILY 14


 


Levothyroxine Sodium [Synthroid] 25 mcg PO DAILY 14


 


Aspirin EC [Ecotrin Low Dose] 81 mg PO DAILY 16


 


Cholecalciferol [Vitamin D3] 1,000 unit PO DAILY 16


 


Diclofenac Sodium [Voltaren] 50 mg PO DAILY 16


 


Memantine [Namenda] 10 mg PO BID 19








                                  Previous Rx's











 Medication  Instructions  Recorded


 


Atorvastatin [Lipitor] 80 mg PO HS #30 tab 14


 


Enalapril [Vasotec] 5 mg PO BID #60 tab 14


 


Metoprolol Tartrate [Lopressor] 25 mg PO BID #60 tab 14


 


Nitroglycerin Sl Tabs [Nitrostat] 0.4 mg SUBLINGUAL Q5M PRN #25 tab 14











                                    Allergies











Allergy/AdvReac Type Severity Reaction Status Date / Time


 


Penicillins Allergy  Unknown Verified 19 13:02


 


propoxyphene HCl Allergy  Confusion Verified 19 13:02





[From [facility]]     














Review of Systems


ROS Statement: 


Those systems with pertinent positive or pertinent negative responses have been 

documented in the HPI.





ROS Other: All systems not noted in ROS Statement are negative.





Past Medical History


Past Medical History: Dementia, Diabetes Mellitus, Hyperlipidemia, Hypertension,

Memory Impairment, Osteoarthritis (OA), Thyroid Disorder


Additional Past Medical History / Comment(s): ALZHEIMER'S


History of Any Multi-Drug Resistant Organisms: None Reported


Past Surgical History: Heart Catheterization With Stent, Tonsillectomy


Additional Past Surgical History / Comment(s): cataract surgery, colonoscopy,


Past Anesthesia/Blood Transfusion Reactions: No Reported Reaction


Date of Last Stent Placement:: 


Past Psychological History: No Psychological Hx Reported


Smoking Status: Never smoker


Past Alcohol Use History: None Reported


Past Drug Use History: None Reported





- Past Family History


  ** Mother


Family Medical History: Asthma





  ** Father


Family Medical History: Deep Vein Thrombosis (DVT), Myocardial Infarction (MI)


Additional Family Medical History / Comment(s):  at 59





  ** Sister(s)


Family Medical History: Cancer


Additional Family Medical History / Comment(s): 2 SISTERS HAD CANCER





General Exam





- General Exam Comments


Initial Comments: 





GENERAL:


Patient is well-developed and well-nourished.  Patient is nontoxic and well-

hydrated and is in no distress.





ENT:


Neck is soft and supple.  No significant lymphadenopathy is noted.  Oropharynx 

is clear.  Moist mucous membranes.  Neck has full range of motion without 

eliciting any pain.  





EYES:


The sclera were anicteric and conjunctiva were pink and moist.  Extraocular 

movements were intact and pupils were equal round and reactive to light.  

Eyelids were unremarkable.





PULMONARY:


Unlabored respirations.  Good breath sounds bilaterally.  No audible rales 

rhonchi or wheezing was noted.





CARDIOVASCULAR:


There is a regular rate and rhythm without any murmurs gallops or rubs. 





ABDOMEN:


Soft and nontender with normal bowel sounds.  





SKIN:


Skin is clear with no lesions or rashes and otherwise unremarkable.





NEUROLOGIC:


Patient is alert and oriented 1.  Cranial nerves II through XII are grossly 

intact.  Motor intact.    Symmetrical smile.  





MUSCULOSKELETAL:


Normal extremities with adequate strength and full range of motion. 





LYMPHATICS:


No significant lymphadenopathy is noted





PSYCHIATRIC:


Unable to assess


Limitations: altered mental status





Course


                                   Vital Signs











  19





  12:30 14:08 15:00


 


Temperature 97.9 F  


 


Pulse Rate 54 L 45 L 


 


Respiratory 22 18 17





Rate   


 


Blood Pressure 118/92 150/72 


 


O2 Sat by Pulse 95 99 





Oximetry   














Medical Decision Making





- Medical Decision Making





EKG shows sinus bradycardia 52 bpm IL interval 256 QRS is 86 QT interval 468 QTC

is 435.





- Lab Data


Result diagrams: 


                                 19 13:45





                                 19 13:45


                                   Lab Results











  19 Range/Units





  13:45 13:45 13:45 


 


WBC  18.4 H    (3.8-10.6)  k/uL


 


RBC  4.89    (3.80-5.40)  m/uL


 


Hgb  13.1    (11.4-16.0)  gm/dL


 


Hct  40.9    (34.0-46.0)  %


 


MCV  83.5    (80.0-100.0)  fL


 


MCH  26.8    (25.0-35.0)  pg


 


MCHC  32.1    (31.0-37.0)  g/dL


 


RDW  16.1 H    (11.5-15.5)  %


 


Plt Count  258    (150-450)  k/uL


 


Neutrophils %  85    %


 


Lymphocytes %  10    %


 


Monocytes %  2    %


 


Eosinophils %  1    %


 


Basophils %  0    %


 


Neutrophils #  15.6 H    (1.3-7.7)  k/uL


 


Lymphocytes #  1.9    (1.0-4.8)  k/uL


 


Monocytes #  0.4    (0-1.0)  k/uL


 


Eosinophils #  0.3    (0-0.7)  k/uL


 


Basophils #  0.1    (0-0.2)  k/uL


 


Anisocytosis  Slight    


 


PT    10.1  (9.0-12.0)  sec


 


INR    0.9  (<1.2)  


 


APTT    21.5 L  (22.0-30.0)  sec


 


Sodium   143   (137-145)  mmol/L


 


Potassium   5.3 H   (3.5-5.1)  mmol/L


 


Chloride   106   ()  mmol/L


 


Carbon Dioxide   28   (22-30)  mmol/L


 


Anion Gap   9   mmol/L


 


BUN   21 H   (7-17)  mg/dL


 


Creatinine   1.00   (0.52-1.04)  mg/dL


 


Est GFR (CKD-EPI)AfAm   62   (>60 ml/min/1.73 sqM)  


 


Est GFR (CKD-EPI)NonAf   53   (>60 ml/min/1.73 sqM)  


 


Glucose   157 H   (74-99)  mg/dL


 


Calcium   9.2   (8.4-10.2)  mg/dL


 


Magnesium   2.1   (1.6-2.3)  mg/dL


 


Total Bilirubin   0.5   (0.2-1.3)  mg/dL


 


AST   24   (14-36)  U/L


 


ALT   25   (9-52)  U/L


 


Alkaline Phosphatase   130 H   ()  U/L


 


Troponin I     (0.000-0.034)  ng/mL


 


Total Protein   7.1   (6.3-8.2)  g/dL


 


Albumin   4.3   (3.5-5.0)  g/dL














  19 Range/Units





  13:45 


 


WBC   (3.8-10.6)  k/uL


 


RBC   (3.80-5.40)  m/uL


 


Hgb   (11.4-16.0)  gm/dL


 


Hct   (34.0-46.0)  %


 


MCV   (80.0-100.0)  fL


 


MCH   (25.0-35.0)  pg


 


MCHC   (31.0-37.0)  g/dL


 


RDW   (11.5-15.5)  %


 


Plt Count   (150-450)  k/uL


 


Neutrophils %   %


 


Lymphocytes %   %


 


Monocytes %   %


 


Eosinophils %   %


 


Basophils %   %


 


Neutrophils #   (1.3-7.7)  k/uL


 


Lymphocytes #   (1.0-4.8)  k/uL


 


Monocytes #   (0-1.0)  k/uL


 


Eosinophils #   (0-0.7)  k/uL


 


Basophils #   (0-0.2)  k/uL


 


Anisocytosis   


 


PT   (9.0-12.0)  sec


 


INR   (<1.2)  


 


APTT   (22.0-30.0)  sec


 


Sodium   (137-145)  mmol/L


 


Potassium   (3.5-5.1)  mmol/L


 


Chloride   ()  mmol/L


 


Carbon Dioxide   (22-30)  mmol/L


 


Anion Gap   mmol/L


 


BUN   (7-17)  mg/dL


 


Creatinine   (0.52-1.04)  mg/dL


 


Est GFR (CKD-EPI)AfAm   (>60 ml/min/1.73 sqM)  


 


Est GFR (CKD-EPI)NonAf   (>60 ml/min/1.73 sqM)  


 


Glucose   (74-99)  mg/dL


 


Calcium   (8.4-10.2)  mg/dL


 


Magnesium   (1.6-2.3)  mg/dL


 


Total Bilirubin   (0.2-1.3)  mg/dL


 


AST   (14-36)  U/L


 


ALT   (9-52)  U/L


 


Alkaline Phosphatase   ()  U/L


 


Troponin I  <0.012  (0.000-0.034)  ng/mL


 


Total Protein   (6.3-8.2)  g/dL


 


Albumin   (3.5-5.0)  g/dL














Disposition


Clinical Impression: 


 Leukocytosis, Vomiting, Chest pain





Disposition: ADMITTED AS IP TO THIS HOSP


Referrals: 


Geremias Quiñones MD [Primary Care Provider] - 1-2 days


Time of Disposition: 16:02

## 2019-08-29 LAB
ALBUMIN SERPL-MCNC: 3.6 G/DL (ref 3.5–5)
ALP SERPL-CCNC: 112 U/L (ref 38–126)
ALT SERPL-CCNC: 16 U/L (ref 9–52)
ANION GAP SERPL CALC-SCNC: 11 MMOL/L
APTT BLD: 24.7 SEC (ref 22–30)
AST SERPL-CCNC: 22 U/L (ref 14–36)
BASOPHILS # BLD AUTO: 0.1 K/UL (ref 0–0.2)
BASOPHILS NFR BLD AUTO: 0 %
BUN SERPL-SCNC: 16 MG/DL (ref 7–17)
CALCIUM SPEC-MCNC: 8.5 MG/DL (ref 8.4–10.2)
CHLORIDE SERPL-SCNC: 107 MMOL/L (ref 98–107)
CO2 SERPL-SCNC: 23 MMOL/L (ref 22–30)
EOSINOPHIL # BLD AUTO: 0.1 K/UL (ref 0–0.7)
EOSINOPHIL NFR BLD AUTO: 1 %
ERYTHROCYTE [DISTWIDTH] IN BLOOD BY AUTOMATED COUNT: 4.74 M/UL (ref 3.8–5.4)
ERYTHROCYTE [DISTWIDTH] IN BLOOD: 15.7 % (ref 11.5–15.5)
GLUCOSE BLD-MCNC: 104 MG/DL (ref 75–99)
GLUCOSE BLD-MCNC: 133 MG/DL (ref 75–99)
GLUCOSE SERPL-MCNC: 106 MG/DL (ref 74–99)
HCT VFR BLD AUTO: 39.8 % (ref 34–46)
HGB BLD-MCNC: 12.6 GM/DL (ref 11.4–16)
INR PPP: 0.9 (ref ?–1.2)
LYMPHOCYTES # SPEC AUTO: 2.3 K/UL (ref 1–4.8)
LYMPHOCYTES NFR SPEC AUTO: 16 %
MCH RBC QN AUTO: 26.7 PG (ref 25–35)
MCHC RBC AUTO-ENTMCNC: 31.8 G/DL (ref 31–37)
MCV RBC AUTO: 83.9 FL (ref 80–100)
MONOCYTES # BLD AUTO: 0.8 K/UL (ref 0–1)
MONOCYTES NFR BLD AUTO: 5 %
NEUTROPHILS # BLD AUTO: 11.6 K/UL (ref 1.3–7.7)
NEUTROPHILS NFR BLD AUTO: 77 %
PH UR: 5.5 [PH] (ref 5–8)
PLATELET # BLD AUTO: 228 K/UL (ref 150–450)
POTASSIUM SERPL-SCNC: 3.7 MMOL/L (ref 3.5–5.1)
PROT SERPL-MCNC: 6.1 G/DL (ref 6.3–8.2)
PT BLD: 10.2 SEC (ref 9–12)
RBC UR QL: 6 /HPF (ref 0–5)
SODIUM SERPL-SCNC: 141 MMOL/L (ref 137–145)
SP GR UR: 1.02 (ref 1–1.03)
SQUAMOUS UR QL AUTO: 12 /HPF (ref 0–4)
UROBILINOGEN UR QL STRIP: <2 MG/DL (ref ?–2)
WBC # BLD AUTO: 15 K/UL (ref 3.8–10.6)

## 2019-08-29 RX ADMIN — MEMANTINE HYDROCHLORIDE SCH MG: 10 TABLET ORAL at 21:02

## 2019-08-29 RX ADMIN — LISINOPRIL SCH MG: 10 TABLET ORAL at 12:07

## 2019-08-29 RX ADMIN — HEPARIN SODIUM SCH UNIT: 5000 INJECTION, SOLUTION INTRAVENOUS; SUBCUTANEOUS at 21:02

## 2019-08-29 RX ADMIN — LISINOPRIL SCH MG: 10 TABLET ORAL at 21:01

## 2019-08-29 RX ADMIN — ASPIRIN 81 MG CHEWABLE TABLET SCH MG: 81 TABLET CHEWABLE at 12:07

## 2019-08-29 RX ADMIN — METOPROLOL TARTRATE SCH MG: 25 TABLET, FILM COATED ORAL at 21:02

## 2019-08-29 RX ADMIN — ETODOLAC SCH MG: 200 CAPSULE ORAL at 12:07

## 2019-08-29 RX ADMIN — DONEPEZIL HYDROCHLORIDE SCH MG: 10 TABLET ORAL at 12:07

## 2019-08-29 RX ADMIN — Medication SCH UNIT: at 12:07

## 2019-08-29 RX ADMIN — METOPROLOL TARTRATE SCH MG: 25 TABLET, FILM COATED ORAL at 12:07

## 2019-08-29 RX ADMIN — LEVOTHYROXINE SODIUM SCH MCG: 25 TABLET ORAL at 05:57

## 2019-08-29 RX ADMIN — CLINDAMYCIN PHOSPHATE SCH MLS/HR: 150 INJECTION, SOLUTION INTRAVENOUS at 23:59

## 2019-08-29 RX ADMIN — ATORVASTATIN CALCIUM SCH MG: 80 TABLET, FILM COATED ORAL at 21:01

## 2019-08-29 RX ADMIN — MEMANTINE HYDROCHLORIDE SCH MG: 10 TABLET ORAL at 12:07

## 2019-08-29 NOTE — NM
EXAMINATION TYPE: NM hepatobiliary wo EF

 

DATE OF EXAM: 8/29/2019

 

COMPARISON: NONE

 

HISTORY: Cholecystitis. Pain

 

TECHNIQUE: After the intravenous administration of 5.3 mCi Tc 99m Mebrofenin hepatobiliary scintigrap
hy is performed.  Immediate images post injection.

 

FINDINGS: 

There is prompt uptake of the tracer by the liver that has normal size and contour. There is no focal
 liver defect. There is tracer in the small bowel at 16 minutes which excludes obstruction of the com
mon bile duct. Images were obtained up to 3 hours that show no evidence of any tracer accumulation in
 the gallbladder.

 

IMPRESSION: There is nonvisualization of the gallbladder consistent with acute cholecystitis and cyst
ic duct obstruction. No evidence of obstruction of the common bile duct.

## 2019-08-29 NOTE — US
EXAMINATION TYPE: US abdomen complete

 

DATE OF EXAM: 8/29/2019

 

COMPARISON: CT abdomen pelvis dated 8/28/2019

 

CLINICAL HISTORY: pancreas cyst.

 

EXAM MEASUREMENTS:

 

Liver Length:  14.5 cm   

Gallbladder Wall:  0.6 cm   

CBD:  0.6 cm

Spleen:  8.1 cm   

Right Kidney:  9.5 x 3.5 x 4.1 cm 

Left Kidney:  9.2 x 4.4 x 4.3 cm   

 

Confused patient unable to cooperate with examiner with severe overlying bowel gas.

 

Pancreas:  Mostly obscured by overlying bowel gas, unable to visualize area seen on ct

Liver: some portions obscured by overlying bowel gas, viewed portions wnl 

Gallbladder: wall thickened, some pericholecystic fluid surrounding,  probable sludge

**Evidence for sonographic Hastings's sign:  no

CBD:  dilated

Spleen:  wnl   

Right Kidney:  No hydronephrosis or masses seen, inferior pole obscured by bowel gas    

Left Kidney:  Inferior pole obscured by bowel gas, cyst measuring 1.1cm  

Upper IVC:  wnl  

Abd Aorta:  mostly obscured by overlying bowel gas, portions visualized wnl

 

 

IMPRESSION: 

1. Findings concerning for acute cholecystitis with dilated common bile duct, pericholecystic fluid, 
biliary sludge, and gallbladder wall thickening. Correlate with physical exam and serum laboratory va
lues. HIDA scan could be considered if clinical and laboratory findings are equivocal.

2. The exam is severely limited by extensive bowel gas. The pancreas is markedly suboptimally visuali
zed and overall nondiagnostic. For better characterization of the pancreas considering the questioned
 mass on the recent CT, enhanced MR abdomen (pancreatic mass protocol) would be recommended.

3. Limited evaluation of the kidneys, liver, and aorta with left renal cyst identified.

## 2019-08-29 NOTE — P.GSCN
History of Present Illness


Consult date: 19


History of present illness: 





Patient seen and evaluated.  Patient presented with acute chest pain placed on 

Plavix and aspirin including heparin drip.  Ultrasound consistent with 

cholecystitis.  With recent cardiac event, conservative management described.  

Recommend HIDA scan.  If HIDA scan positive, cholecystotomy tube and give 

informed.  We'll need at least 5 days off Plavix prior to surgical intervention 

if needed.





Surgical options described with the patient and family.





Past Medical History


Past Medical History: Dementia, Hyperlipidemia, Hypertension, Osteoarthritis 

(OA), Thyroid Disorder


Additional Past Medical History / Comment(s): ALZHEIMER'S


History of Any Multi-Drug Resistant Organisms: None Reported


Past Surgical History: Heart Catheterization With Stent, Tonsillectomy


Additional Past Surgical History / Comment(s): cataract surgery, colonoscopy,


Past Anesthesia/Blood Transfusion Reactions: No Reported Reaction


Date of Last Stent Placement:: 


Past Psychological History: No Psychological Hx Reported


Smoking Status: Never smoker


Past Alcohol Use History: None Reported


Past Drug Use History: None Reported





- Past Family History


  ** Mother


Family Medical History: Asthma





  ** Father


Family Medical History: Deep Vein Thrombosis (DVT), Myocardial Infarction (MI)


Additional Family Medical History / Comment(s):  at 59





  ** Sister(s)


Family Medical History: Cancer


Additional Family Medical History / Comment(s): 2 SISTERS HAD CANCER





Medications and Allergies


                                Home Medications











 Medication  Instructions  Recorded  Confirmed  Type


 


Donepezil [Aricept] 10 mg PO DAILY 14 History


 


Levothyroxine Sodium [Synthroid] 25 mcg PO DAILY 14 History


 


Atorvastatin [Lipitor] 80 mg PO HS #30 tab 14 Rx


 


Enalapril [Vasotec] 5 mg PO BID #60 tab 14 Rx


 


Metoprolol Tartrate [Lopressor] 25 mg PO BID #60 tab 14 Rx


 


Nitroglycerin Sl Tabs [Nitrostat] 0.4 mg SUBLINGUAL Q5M PRN #25 tab 14 Rx


 


Aspirin EC [Ecotrin Low Dose] 81 mg PO DAILY 16 History


 


Cholecalciferol [Vitamin D3] 1,000 unit PO DAILY 16 History


 


Diclofenac Sodium [Voltaren] 50 mg PO DAILY 16 History


 


Memantine [Namenda] 10 mg PO BID 19 History








                                    Allergies











Allergy/AdvReac Type Severity Reaction Status Date / Time


 


Penicillins Allergy  Unknown Verified 19 13:02


 


propoxyphene HCl Allergy  Confusion Verified 19 13:02





[From [facility]]     














Surgical - Exam


                                   Vital Signs











Temp Pulse Resp BP Pulse Ox


 


 97.9 F   54 L  22   118/92   95 


 


 19 12:30  19 12:30  19 12:30  19 12:30  19 12:30














Results





- Labs





                                 19 05:59





                                 19 05:59


                  Abnormal Lab Results - Last 24 Hours (Table)











  19 Range/Units





  13:45 13:45 13:45 


 


WBC  18.4 H    (3.8-10.6)  k/uL


 


RDW  16.1 H    (11.5-15.5)  %


 


Neutrophils #  15.6 H    (1.3-7.7)  k/uL


 


APTT    21.5 L  (22.0-30.0)  sec


 


Potassium   5.3 H   (3.5-5.1)  mmol/L


 


BUN   21 H   (7-17)  mg/dL


 


Glucose   157 H   (74-99)  mg/dL


 


POC Glucose (mg/dL)     (75-99)  mg/dL


 


Alkaline Phosphatase   130 H   ()  U/L


 


Troponin I     (0.000-0.034)  ng/mL


 


Total Protein     (6.3-8.2)  g/dL


 


Urine Appearance     (Clear)  


 


Urine Nitrite     (Negative)  


 


Ur Leukocyte Esterase     (Negative)  


 


Urine RBC     (0-5)  /hpf


 


Urine WBC     (0-5)  /hpf


 


Ur Squamous Epith Cells     (0-4)  /hpf


 


Urine Bacteria     (None)  /hpf


 


Urine Mucus     (None)  /hpf














  19 Range/Units





  20:25 21:25 05:59 


 


WBC    15.0 H  (3.8-10.6)  k/uL


 


RDW    15.7 H  (11.5-15.5)  %


 


Neutrophils #    11.6 H  (1.3-7.7)  k/uL


 


APTT     (22.0-30.0)  sec


 


Potassium     (3.5-5.1)  mmol/L


 


BUN     (7-17)  mg/dL


 


Glucose     (74-99)  mg/dL


 


POC Glucose (mg/dL)  129 H    (75-99)  mg/dL


 


Alkaline Phosphatase     ()  U/L


 


Troponin I   0.073 H*   (0.000-0.034)  ng/mL


 


Total Protein     (6.3-8.2)  g/dL


 


Urine Appearance     (Clear)  


 


Urine Nitrite     (Negative)  


 


Ur Leukocyte Esterase     (Negative)  


 


Urine RBC     (0-5)  /hpf


 


Urine WBC     (0-5)  /hpf


 


Ur Squamous Epith Cells     (0-4)  /hpf


 


Urine Bacteria     (None)  /hpf


 


Urine Mucus     (None)  /hpf














  19 Range/Units





  05:59 05:59 06:46 


 


WBC     (3.8-10.6)  k/uL


 


RDW     (11.5-15.5)  %


 


Neutrophils #     (1.3-7.7)  k/uL


 


APTT     (22.0-30.0)  sec


 


Potassium     (3.5-5.1)  mmol/L


 


BUN     (7-17)  mg/dL


 


Glucose  106 H    (74-99)  mg/dL


 


POC Glucose (mg/dL)    104 H  (75-99)  mg/dL


 


Alkaline Phosphatase     ()  U/L


 


Troponin I   0.103 H*   (0.000-0.034)  ng/mL


 


Total Protein  6.1 L    (6.3-8.2)  g/dL


 


Urine Appearance     (Clear)  


 


Urine Nitrite     (Negative)  


 


Ur Leukocyte Esterase     (Negative)  


 


Urine RBC     (0-5)  /hpf


 


Urine WBC     (0-5)  /hpf


 


Ur Squamous Epith Cells     (0-4)  /hpf


 


Urine Bacteria     (None)  /hpf


 


Urine Mucus     (None)  /hpf














  19 Range/Units





  08:55 11:57 


 


WBC    (3.8-10.6)  k/uL


 


RDW    (11.5-15.5)  %


 


Neutrophils #    (1.3-7.7)  k/uL


 


APTT    (22.0-30.0)  sec


 


Potassium    (3.5-5.1)  mmol/L


 


BUN    (7-17)  mg/dL


 


Glucose    (74-99)  mg/dL


 


POC Glucose (mg/dL)   133 H  (75-99)  mg/dL


 


Alkaline Phosphatase    ()  U/L


 


Troponin I    (0.000-0.034)  ng/mL


 


Total Protein    (6.3-8.2)  g/dL


 


Urine Appearance  Cloudy H   (Clear)  


 


Urine Nitrite  Positive H   (Negative)  


 


Ur Leukocyte Esterase  Moderate H   (Negative)  


 


Urine RBC  6 H   (0-5)  /hpf


 


Urine WBC  15 H   (0-5)  /hpf


 


Ur Squamous Epith Cells  12 H   (0-4)  /hpf


 


Urine Bacteria  Many H   (None)  /hpf


 


Urine Mucus  Rare H   (None)  /hpf








                                 Diabetes panel











  19 Range/Units





  13:45 05:59 


 


Sodium  143  141  (137-145)  mmol/L


 


Potassium  5.3 H  3.7  (3.5-5.1)  mmol/L


 


Chloride  106  107  ()  mmol/L


 


Carbon Dioxide  28  23  (22-30)  mmol/L


 


BUN  21 H  16  (7-17)  mg/dL


 


Creatinine  1.00  0.76  (0.52-1.04)  mg/dL


 


Glucose  157 H  106 H  (74-99)  mg/dL


 


Calcium  9.2  8.5  (8.4-10.2)  mg/dL


 


AST  24  22  (14-36)  U/L


 


ALT  25  16  (9-52)  U/L


 


Alkaline Phosphatase  130 H  112  ()  U/L


 


Total Protein  7.1  6.1 L  (6.3-8.2)  g/dL


 


Albumin  4.3  3.6  (3.5-5.0)  g/dL








                                  Calcium panel











  19 Range/Units





  13:45 05:59 


 


Calcium  9.2  8.5  (8.4-10.2)  mg/dL


 


Albumin  4.3  3.6  (3.5-5.0)  g/dL








                                 Pituitary panel











  19 Range/Units





  13:45 05:59 


 


Sodium  143  141  (137-145)  mmol/L


 


Potassium  5.3 H  3.7  (3.5-5.1)  mmol/L


 


Chloride  106  107  ()  mmol/L


 


Carbon Dioxide  28  23  (22-30)  mmol/L


 


BUN  21 H  16  (7-17)  mg/dL


 


Creatinine  1.00  0.76  (0.52-1.04)  mg/dL


 


Glucose  157 H  106 H  (74-99)  mg/dL


 


Calcium  9.2  8.5  (8.4-10.2)  mg/dL








                                  Adrenal panel











  19 Range/Units





  13:45 05:59 


 


Sodium  143  141  (137-145)  mmol/L


 


Potassium  5.3 H  3.7  (3.5-5.1)  mmol/L


 


Chloride  106  107  ()  mmol/L


 


Carbon Dioxide  28  23  (22-30)  mmol/L


 


BUN  21 H  16  (7-17)  mg/dL


 


Creatinine  1.00  0.76  (0.52-1.04)  mg/dL


 


Glucose  157 H  106 H  (74-99)  mg/dL


 


Calcium  9.2  8.5  (8.4-10.2)  mg/dL


 


Total Bilirubin  0.5  0.6  (0.2-1.3)  mg/dL


 


AST  24  22  (14-36)  U/L


 


ALT  25  16  (9-52)  U/L


 


Alkaline Phosphatase  130 H  112  ()  U/L


 


Total Protein  7.1  6.1 L  (6.3-8.2)  g/dL


 


Albumin  4.3  3.6  (3.5-5.0)  g/dL

## 2019-08-29 NOTE — P.PN
Subjective


Progress Note Date: 08/29/19


This is an 83-year-old female admitted with non-STEMI, acute cholecystitis, 

possible acute UTI, final urine culture pending and multiple other medical 

issues.  Troponins less than 0.012, 0.073, 0.103.  Echo reported normal LV 

function, EF greater than 55% . Evaluated by cardiology, medical management 

recommended.  Patient currently on heparin drip.  CT reported ill-defined 

hypodensity within the anterior head of the pancreas measuring 3.5 x 1.2 cm, 

possible neoplasm, possible pancreatitis (though lipase only a 68).,  

Diverticulosis, nonspecific right adnexal large calcification -no specific, 

moderate size hiatal hernia, Abdominal ultrasound reporting acute cholecystitis 

with dilated common bile duct, pericholecystic fluid, biliary sludge and 

gallbladder wall thickening, visualization of pancreas suboptimal, limited 

evaluation of kidneys liver aorta with left renal cyst.  Evaluated by surgery, H

TRUPTI scan ordered.  Possible UTI with urine culture pending.  Afebrile, WBC 

trending down, 15.  Denies chest pain, palpitations or shortness of breath.  

Denies abdominal pain.  Telemetry sinus bradycardia.








Objective





- Vital Signs


Vital signs: 


                                   Vital Signs











Temp  98.2 F   08/29/19 16:00


 


Pulse  53 L  08/29/19 16:00


 


Resp  17   08/29/19 16:41


 


BP  118/79   08/29/19 16:00


 


Pulse Ox  96   08/29/19 16:00








                                 Intake & Output











 08/28/19 08/29/19 08/29/19





 18:59 06:59 18:59


 


Intake Total  75 54.839


 


Balance  75 54.839


 


Weight 58.967 kg  


 


Intake:   


 


  Intake, IV Titration  75 54.839





  Amount   


 


    Heparin Sod,Pork in 0.45%   54.839





    NaCl 25,000 unit In 0.45   





    % NaCl 1 250ml.bag @ 12   





    UNITS/KG/HR 7.076 mls/hr   





    IV .Q24H [facility] Rx#:   





    [id number]   


 


    Sodium Chloride 0.9% 1,  75 





    000 ml @ 75 mls/hr IV .   





    [id number] ONE Rx#:[id number]   


 


Other:   


 


  Voiding Method  Incontinent Diaper





   Incontinent


 


  # Voids  1 2














- Exam


PHYSICAL EXAM:


VITAL SIGNS: As above


GENERAL: Lying in bed, no acute distress,


HEENT:  Conjunctivae normal. eyes normal. 


NECK:  No JVD. No thyroid enlargement. No LNs


CARDIOVASCULAR:  S1, S2 regular.systolic murmur


RESPIRATION: Breath sounds diminished in the bases. No rhonchi or crackles. No 

bronchial breathing.


ABDOMEN:  Soft,  nontender . No guarding. no masses palpable. No ascites, No 

hepatosplenomegaly.Bowel sounds heard.


LEGS:  No edema. no swelling.  No calf tenderness. 


PSYCHIATRY: Alert and oriented X1-2, mood and affect normal.


NERVOUS SYSTEM:  Cranial N 2-12 grossly normal. Moves all 4 limbs.  Diffuse 

weakness, No focal deficits.  Strength and sensation grossly intact..


Skin: no lesions, no rash 











- Labs


CBC & Chem 7: 


                                 08/29/19 05:59





                                 08/29/19 05:59


Labs: 


                  Abnormal Lab Results - Last 24 Hours (Table)











  08/28/19 08/28/19 08/29/19 Range/Units





  20:25 21:25 05:59 


 


WBC    15.0 H  (3.8-10.6)  k/uL


 


RDW    15.7 H  (11.5-15.5)  %


 


Neutrophils #    11.6 H  (1.3-7.7)  k/uL


 


APTT     (22.0-30.0)  sec


 


Glucose     (74-99)  mg/dL


 


POC Glucose (mg/dL)  129 H    (75-99)  mg/dL


 


Troponin I   0.073 H*   (0.000-0.034)  ng/mL


 


Total Protein     (6.3-8.2)  g/dL


 


Urine Appearance     (Clear)  


 


Urine Nitrite     (Negative)  


 


Ur Leukocyte Esterase     (Negative)  


 


Urine RBC     (0-5)  /hpf


 


Urine WBC     (0-5)  /hpf


 


Ur Squamous Epith Cells     (0-4)  /hpf


 


Urine Bacteria     (None)  /hpf


 


Urine Mucus     (None)  /hpf














  08/29/19 08/29/19 08/29/19 Range/Units





  05:59 05:59 06:46 


 


WBC     (3.8-10.6)  k/uL


 


RDW     (11.5-15.5)  %


 


Neutrophils #     (1.3-7.7)  k/uL


 


APTT     (22.0-30.0)  sec


 


Glucose  106 H    (74-99)  mg/dL


 


POC Glucose (mg/dL)    104 H  (75-99)  mg/dL


 


Troponin I   0.103 H*   (0.000-0.034)  ng/mL


 


Total Protein  6.1 L    (6.3-8.2)  g/dL


 


Urine Appearance     (Clear)  


 


Urine Nitrite     (Negative)  


 


Ur Leukocyte Esterase     (Negative)  


 


Urine RBC     (0-5)  /hpf


 


Urine WBC     (0-5)  /hpf


 


Ur Squamous Epith Cells     (0-4)  /hpf


 


Urine Bacteria     (None)  /hpf


 


Urine Mucus     (None)  /hpf














  08/29/19 08/29/19 08/29/19 Range/Units





  08:55 11:57 14:50 


 


WBC     (3.8-10.6)  k/uL


 


RDW     (11.5-15.5)  %


 


Neutrophils #     (1.3-7.7)  k/uL


 


APTT    106.1 H*  (22.0-30.0)  sec


 


Glucose     (74-99)  mg/dL


 


POC Glucose (mg/dL)   133 H   (75-99)  mg/dL


 


Troponin I     (0.000-0.034)  ng/mL


 


Total Protein     (6.3-8.2)  g/dL


 


Urine Appearance  Cloudy H    (Clear)  


 


Urine Nitrite  Positive H    (Negative)  


 


Ur Leukocyte Esterase  Moderate H    (Negative)  


 


Urine RBC  6 H    (0-5)  /hpf


 


Urine WBC  15 H    (0-5)  /hpf


 


Ur Squamous Epith Cells  12 H    (0-4)  /hpf


 


Urine Bacteria  Many H    (None)  /hpf


 


Urine Mucus  Rare H    (None)  /hpf








                      Microbiology - Last 24 Hours (Table)











 08/29/19 08:55 Urine Culture - Preliminary





 Urine,Voided 














Assessment and Plan


Assessment: 


Active non-STEMI


-Acute cholecystitis


-Leukocytosis secondary to the above


-Possible acute UTI, urine screen pending, asymptomatic


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


-CAD with history of stent 


-Hypertension


-Alzheimer's dementia


-Diabetes mellitus


-Dyslipidemia

















Plan: Continue on current medication regime ,monitoring and symptomatic 

treatment.  Maintained on statin, aspirin, ACE inhibitor and beta blocker.  

Discussed patient potentially may need cholecystectomy with Dr. Shelby; heparin 

drip and Plavix discontinued, placed on heparin subcu. If HIDA scan indicative 

of acute cholecystitis, patient has been cleared for surgery by cardiology.  MRI

of abdomen ordered regarding potential pancreatic neoplasm/mass.  Further 

recommendations to follow.




















The impression and plan of care has been dictated as directed.





:


I performed a history and examination of this patient,  discussed the same with 

the dictator.  I agree with the dictator's note ,documented as a scribe.  Any 

additional findings or plans will be noted.

## 2019-08-29 NOTE — HP
HISTORY AND PHYSICAL



An 83-year-old white female came to emergency room with her  with some chest

pain.  Wife _____chest and her upper belly, she vomited at home x1.  Gave her 3

nitroglycerins at home.  She continues to put her hands on her upper belly and chest

and anterior chest. Had a white count of 18,000.  CAT scans have been ordered of the

lungs and abdomen.  No diarrhea, some cough, congestion.



HOME MEDICINES:

1. Aricept 10 mg daily.

2. Synthroid 45 mcg daily.

3. Namenda 10 b.i.d.

4. Voltaren 50 mg daily.

5. Vitamin D 1000 daily.



ALLERGIES:

To PENICILLIN and DARVON.



14 POINT REVIEW OF SYSTEMS:

Negative except for as mentioned in HPI.



PAST MEDICAL HISTORY:

Dementia, diabetes mellitus, dyslipidemia, hypertension, osteoarthritis,

hypothyroidism, Alzheimer's.



SURGERY:

Heart catheterization with stent, tonsillectomy, cataract surgery, colonoscopy.



No smoke.



FAMILY HISTORY:

Father DVT, myocardial infarction.  Sister cancer.



VITAL SIGNS:  Stable, afebrile.

CARDIOVASCULAR:  S1, S2.

LUNGS:  Transmitted upper airway sounds.

HEMATOLOGY:  Negative Homans.

PSYCH:  Pleasant mood and affect. Alert and oriented x1.

GI: Some mild tenderness to palpation epigastric. Appears in no acute respiratory

distress.



Blood pressure 118 to 150/72 to 92, O2 is 95% to 99% on room air. Temp 97.9, pulse 45-

54.



White count is 18.4, hemoglobin 13.1.



ASSESSMENT:

1. Leukocytosis.

2. Vomiting, chest pain, RO myocardial infarction.  Rule out pancreatitis.

CAT scan of the abdomen and chest, and urine culture have been ordered.  Cardiology

consult for chest pain and possible elevated troponin

.





MMODL / IJN: [id number] / Job#: [id number]

## 2019-08-29 NOTE — P.CRDCN
History of Present Illness


History of present illness: 


This is a pleasant 83-year-old  female past medical history significant

for coronary artery disease s/p stent placement to the mid-LAD in , 

hypertension and dyslipidemia, hypothyroidism and advanced dementia.  She 

follows in the office with Dr. Gibson.  We have been asked to see her in 

consultation secondary to chest discomfort.  Per her  who gives most of 

the history she was complaining yesterday of a heavy sensation in the left 

precordial region.  He does not recall her complaining of any pain in the back, 

arm, neck or jaw.  This was associated with nausea, vomiting and diaphoresis. 

The patient does not communicate so all information is coming from her . 

He states she was clutching her heart and abdomen intermittently throughout the 

day. He did administer her 3 SL nitro tablets however they didn't seem to help 

her discomfort.  Imaging of her abdomen revealed moderate sized hiatal hernia, 

hypodensity within the head of the pancreas, possible neoplasm versus acute 

pancreatitis, large calcification of the right adnexal region and 

diverticulosis.  Ultrasound of the abdomen revealed acute cholecystitis with 

dilated common bile duct, pericholecystic fluid, biliary sludge and color wall 

thickening.  She is seen and examined laying flat in bed and appears in no acute

distress. No signs to suggest she is uncomfortable or in pain. 





EKG on admission reveals sinus bradycardia heart rate of 50 to, LVH, no acute ST

or T wave abnormalities noted.


Chest x-ray reveals mild cardiomegaly, diffuse interstitial densities and 

possible mild pulmonary vascular congestion.


Laboratory data reviewed, WBC on admission 18.4 repeat this morning 15, 

hemoglobin 12.6, platelets 228, sodium 141, potassium 3.7, creatinine 0.76, 

magnesium 2.1, first troponin was negative second was 0.073 and third with 

0.103, proBNP 3870.


Current cardiac medications include atorvastatin 80 mg daily, Lopressor 25 mg 

twice a day, enalapril 5 mg twice a day and aspirin 81 mg daily.


Most recent echocardiogram obtained in the office revealed preserved LV systolic

function.


Heart catheterization in  revealed RCA free of occlusive disease, circumflex

free of occlusive disease, left main free of occlusive disease and LAD at that 

time had a 99% stenosis in its proximal and midportion.  She underwent 

successful stent placement to the LAD at that time.





Unable to obtain accurate review of systems secondary to mental status.  Patient

smiles and shakes her head no to all questions.





Blood pressure 125/70 heart rate 54 afebrile maintaining oxygen saturation on 

room air


GENERAL: This is a 83-year-old  female in no apparent distress at the 

time of my examination.


HEENT: Head is atraumatic, normocephalic. Pupils are equal, round. Sclerae 

anicteric. Conjunctivae are clear. Mucous membranes of the mouth are moist. Neck

is supple. There is no jugular venous distention. No carotid bruit is heard.


LUNGS: Clear to auscultation no wheezes, rales or rhonchi. No chest wall 

tenderness is noted on palpation or with deep breathing.


HEART: Regular rate and rhythm with faint systolic ejection murmur at the left 

sternal border, no rubs or gallops. S1 and S2 heard.


ABDOMEN: Soft, non-tender. Bowel sounds are heard. No organomegaly noted.


EXTREMITIES: No evidence of peripheral edema and no calf tenderness noted.


VASCULAR: Radial and dorsalis pedis pulses palpated, no evidence of clubbing.  


NEUROLOGIC: Patient is awake and alert, with confusion at baseline.


 


ASSESSMENT


Non-ST elevated myocardial infarction


Acute cholecystitis


Urinary tract infection


Leukocytosis


History of coronary artery disease s/p stent placement to the LAD 


Hypertension


Dyslipidemia


Dementia





PLAN


Initiate on heparin infusion and plavix. 


Obtain 2D echocardiogram and doppler study to assess cardiac structure and 

function.


Given her advanced dementia as well as cholecystits and urinary tract infection 

our recommendation is medical therapy. This was discussed with her  and 

he is in agreement. 


Continue aspirin, atorvastatin, enalapril and lopressor as previously ordered. 


Further recommendations to follow based on clinical course. 


Thank you kindly for this consultation. 





Nurse Practitioner note has been reviewed, I agree with a documented findings 

and plan of care.  Patient was seen and examined.








Past Medical History


Past Medical History: Dementia, Hyperlipidemia, Hypertension, Osteoarthritis 

(OA), Thyroid Disorder


Additional Past Medical History / Comment(s): ALZHEIMER'S


History of Any Multi-Drug Resistant Organisms: None Reported


Past Surgical History: Heart Catheterization With Stent, Tonsillectomy


Additional Past Surgical History / Comment(s): cataract surgery, colonoscopy,


Past Anesthesia/Blood Transfusion Reactions: No Reported Reaction


Date of Last Stent Placement:: 


Past Psychological History: No Psychological Hx Reported


Smoking Status: Never smoker


Past Alcohol Use History: None Reported


Past Drug Use History: None Reported





- Past Family History


  ** Mother


Family Medical History: Asthma





  ** Father


Family Medical History: Deep Vein Thrombosis (DVT), Myocardial Infarction (MI)


Additional Family Medical History / Comment(s):  at 59





  ** Sister(s)


Family Medical History: Cancer


Additional Family Medical History / Comment(s): 2 SISTERS HAD CANCER





Medications and Allergies


                                Home Medications











 Medication  Instructions  Recorded  Confirmed  Type


 


Donepezil [Aricept] 10 mg PO DAILY 14 History


 


Levothyroxine Sodium [Synthroid] 25 mcg PO DAILY 14 History


 


Atorvastatin [Lipitor] 80 mg PO HS #30 tab 14 Rx


 


Enalapril [Vasotec] 5 mg PO BID #60 tab 14 Rx


 


Metoprolol Tartrate [Lopressor] 25 mg PO BID #60 tab 14 Rx


 


Nitroglycerin Sl Tabs [Nitrostat] 0.4 mg SUBLINGUAL Q5M PRN #25 tab 14 Rx


 


Aspirin EC [Ecotrin Low Dose] 81 mg PO DAILY 16 History


 


Cholecalciferol [Vitamin D3] 1,000 unit PO DAILY 16 History


 


Diclofenac Sodium [Voltaren] 50 mg PO DAILY 16 History


 


Memantine [Namenda] 10 mg PO BID 19 History








                                    Allergies











Allergy/AdvReac Type Severity Reaction Status Date / Time


 


Penicillins Allergy  Unknown Verified 19 13:02


 


propoxyphene HCl Allergy  Confusion Verified 19 13:02





[From [facility]]     














Physical Exam


Vitals: 


                                   Vital Signs











  Temp Pulse Pulse Resp BP BP BP


 


 19 04:00  97.8 F   58 L  16    148/63


 


 19 23:28  97.7 F   58 L  16    154/60


 


 19 20:36     16   


 


 19 20:00  97.5 F L   56 L  16   155/60 


 


 19 18:10  97.9 F   54 L  17   165/76 


 


 19 16:44   46 L   18  161/69  


 


 19 15:00     17   


 


 19 14:08   45 L   18  150/72  


 


 08/28/19 12:30  97.9 F  54 L   22  118/92  














  Pulse Ox


 


 19 04:00  95


 


 19 23:28  96


 


 19 20:36 


 


 19 20:00  96


 


 19 18:10  99


 


 19 16:44  97


 


 19 15:00 


 


 19 14:08  99


 


 19 12:30  95








                                Intake and Output











 19





 22:59 06:59 14:59


 


Intake Total  75 


 


Balance  75 


 


Intake:   


 


  Intake, IV Titration  75 





  Amount   


 


    Sodium Chloride 0.9% 1,  75 





    000 ml @ 75 mls/hr IV .   





    [id number] ONE Rx#:[id number]   


 


Other:   


 


  Voiding Method  Incontinent 


 


  # Voids  1 














Results





                                 19 05:59





                                 19 05:59


                                 Cardiac Enzymes











  19 Range/Units





  13:45 13:45 21:25 


 


AST  24    (14-36)  U/L


 


Troponin I   <0.012  0.073 H*  (0.000-0.034)  ng/mL














  19 Range/Units





  05:59 05:59 


 


AST  22   (14-36)  U/L


 


Troponin I   0.103 H*  (0.000-0.034)  ng/mL








                                   Coagulation











  19 Range/Units





  13:45 


 


PT  10.1  (9.0-12.0)  sec


 


APTT  21.5 L  (22.0-30.0)  sec








                                       CBC











  19 Range/Units





  13:45 05:59 


 


WBC  18.4 H  15.0 H  (3.8-10.6)  k/uL


 


RBC  4.89  4.74  (3.80-5.40)  m/uL


 


Hgb  13.1  12.6  (11.4-16.0)  gm/dL


 


Hct  40.9  39.8  (34.0-46.0)  %


 


Plt Count  258  228  (150-450)  k/uL








                          Comprehensive Metabolic Panel











  19 Range/Units





  13:45 05:59 


 


Sodium  143  141  (137-145)  mmol/L


 


Potassium  5.3 H  3.7  (3.5-5.1)  mmol/L


 


Chloride  106  107  ()  mmol/L


 


Carbon Dioxide  28  23  (22-30)  mmol/L


 


BUN  21 H  16  (7-17)  mg/dL


 


Creatinine  1.00  0.76  (0.52-1.04)  mg/dL


 


Glucose  157 H  106 H  (74-99)  mg/dL


 


Calcium  9.2  8.5  (8.4-10.2)  mg/dL


 


AST  24  22  (14-36)  U/L


 


ALT  25  16  (9-52)  U/L


 


Alkaline Phosphatase  130 H  112  ()  U/L


 


Total Protein  7.1  6.1 L  (6.3-8.2)  g/dL


 


Albumin  4.3  3.6  (3.5-5.0)  g/dL








                               Current Medications











Generic Name Dose Route Start Last Admin





  Trade Name Freq  PRN Reason Stop Dose Admin


 


Aspirin  81 mg  19 09:00 





  Aspirin  PO  





  DAILY [facility]  


 


Atorvastatin Calcium  80 mg  19 21:00  19 20:12





  Lipitor  PO   Not Given





  HS [facility]  


 


Cholecalciferol  1,000 unit  19 09:00 





  Vitamin D3 (25 Mcg = 1000 Iu)  PO  





  DAILY [facility]  


 


Donepezil HCl  10 mg  19 09:00 





  Aricept  PO  





  DAILY [facility]  


 


Etodolac  200 mg  19 09:00 





  Lodine  PO  





  DAILY [facility]  


 


Levothyroxine Sodium  25 mcg  19 06:30  19 05:57





  Synthroid  PO   25 mcg





  DAILY@0630 AYE   Administration


 


Lisinopril  10 mg  19 21:00  19 20:12





  Zestril  PO   10 mg





  BID AYE   Administration


 


Memantine  10 mg  19 21:00  19 20:12





  Namenda  PO   10 mg





  BID [facility]   Administration


 


Metoprolol Tartrate  25 mg  19 21:00  19 22:59





  Lopressor  PO   25 mg





  BID [facility]   Administration


 


Nitroglycerin  0.4 mg  19 18:30 





  Nitrostat  SUBLINGUAL  





  Q5M PRN  





  Chest Pain  








                                Intake and Output











 19





 22:59 06:59 14:59


 


Intake Total  75 


 


Balance  75 


 


Intake:   


 


  Intake, IV Titration  75 





  Amount   


 


    Sodium Chloride 0.9% 1,  75 





    000 ml @ 75 mls/hr IV .   





    [id number] ONE Rx#:[id number]   


 


Other:   


 


  Voiding Method  Incontinent 


 


  # Voids  1 








                                        





                                 19 05:59 





                                 19 05:59

## 2019-08-29 NOTE — ECHOF
Referral Reason:cp



MEASUREMENTS

--------

HEIGHT: 132.1 cm

WEIGHT: 59.0 kg

BP: 125/70

RVIDd:   2.3 cm     (< 3.3)

IVSd:   1.0 cm     (0.6 - 1.1)

LVIDd:   3.8 cm     (3.9 - 5.3)

LVPWd:   0.9 cm     (0.6 - 1.1)

IVSs:   1.2 cm

LVIDs:   2.6 cm

LVPWs:   1.4 cm

LA Diam:   3.3 cm     (2.7 - 3.8)

LAESV Index (A-L):   24.70 ml/m

Ao Diam:   2.8 cm     (2.0 - 3.7)

AV Cusp:   1.4 cm     (1.5 - 2.6)

LA Diam:   2.9 cm     (2.7 - 3.8)

MV EXCURSION:   14.664 mm     (> 18.000)

MV EF SLOPE:   52 mm/s     (70 - 150)

EPSS:   0.3 cm

MV E Johnathon:   0.49 m/s

MV DecT:   221 ms

MV A Johnathon:   0.67 m/s

MV E/A Ratio:   0.73 

RAP:   5.00 mmHg

RVSP:   31.05 mmHg







FINDINGS

--------

Sinus rhythm.

This was a technically good study.

LV size, wall thickness and systolic function are normal, with an EF greater than 55%.   The left landy
tricular size is normal.

The right ventricle is normal in size.

The left atrial size is normal.

The right atrial size is normal.

The aortic valve is trileaflet, and appears structurally normal. No aortic stenosis or regurgitation.


Mild mitral regurgitation is present.

Mild tricuspid regurgitation present.   There is no evidence of pulmonary hypertension.   The right v
entricular systolic pressure, as measured by Doppler, is 31.05mmHg.

There is no pulmonic regurgitation present.

The aortic root size is normal.

There is no pericardial effusion.



CONCLUSIONS

--------

1. Sinus rhythm.

2. This was a technically good study.

3. LV size, wall thickness and systolic function are normal, with an EF greater than 55%.

4. The left ventricular size is normal.

5. The right ventricle is normal in size.

6. The left atrial size is normal.

7. The right atrial size is normal.

8. The aortic valve is trileaflet, and appears structurally normal. No aortic stenosis or regurgitati
on.

9. Mild mitral regurgitation is present.

10. Mild tricuspid regurgitation present.

11. There is no evidence of pulmonary hypertension.

12. The right ventricular systolic pressure, as measured by Doppler, is 31.05mmHg.

13. There is no pulmonic regurgitation present.

14. The aortic root size is normal.

15. There is no pericardial effusion.





SONOGRAPHER: Gretchen Boston RDCS

## 2019-08-30 LAB
BASOPHILS # BLD AUTO: 0 K/UL (ref 0–0.2)
BASOPHILS NFR BLD AUTO: 0 %
EOSINOPHIL # BLD AUTO: 0.2 K/UL (ref 0–0.7)
EOSINOPHIL NFR BLD AUTO: 2 %
ERYTHROCYTE [DISTWIDTH] IN BLOOD BY AUTOMATED COUNT: 4.23 M/UL (ref 3.8–5.4)
ERYTHROCYTE [DISTWIDTH] IN BLOOD: 16 % (ref 11.5–15.5)
HCT VFR BLD AUTO: 34.7 % (ref 34–46)
HGB BLD-MCNC: 11.2 GM/DL (ref 11.4–16)
INR PPP: 1 (ref ?–1.2)
LYMPHOCYTES # SPEC AUTO: 2 K/UL (ref 1–4.8)
LYMPHOCYTES NFR SPEC AUTO: 18 %
MCH RBC QN AUTO: 26.6 PG (ref 25–35)
MCHC RBC AUTO-ENTMCNC: 32.4 G/DL (ref 31–37)
MCV RBC AUTO: 82.1 FL (ref 80–100)
MONOCYTES # BLD AUTO: 0.6 K/UL (ref 0–1)
MONOCYTES NFR BLD AUTO: 6 %
NEUTROPHILS # BLD AUTO: 8.4 K/UL (ref 1.3–7.7)
NEUTROPHILS NFR BLD AUTO: 74 %
PLATELET # BLD AUTO: 220 K/UL (ref 150–450)
PT BLD: 10.6 SEC (ref 9–12)
WBC # BLD AUTO: 11.3 K/UL (ref 3.8–10.6)

## 2019-08-30 RX ADMIN — HEPARIN SODIUM SCH UNIT: 5000 INJECTION, SOLUTION INTRAVENOUS; SUBCUTANEOUS at 09:04

## 2019-08-30 RX ADMIN — CLINDAMYCIN PHOSPHATE SCH MLS/HR: 150 INJECTION, SOLUTION INTRAVENOUS at 09:38

## 2019-08-30 RX ADMIN — ETODOLAC SCH MG: 200 CAPSULE ORAL at 09:05

## 2019-08-30 RX ADMIN — ATORVASTATIN CALCIUM SCH MG: 80 TABLET, FILM COATED ORAL at 21:48

## 2019-08-30 RX ADMIN — PANTOPRAZOLE SODIUM SCH MG: 40 INJECTION, POWDER, FOR SOLUTION INTRAVENOUS at 17:44

## 2019-08-30 RX ADMIN — METOPROLOL TARTRATE SCH MG: 25 TABLET, FILM COATED ORAL at 21:50

## 2019-08-30 RX ADMIN — ASPIRIN 81 MG CHEWABLE TABLET SCH MG: 81 TABLET CHEWABLE at 09:05

## 2019-08-30 RX ADMIN — MEMANTINE HYDROCHLORIDE SCH MG: 10 TABLET ORAL at 21:48

## 2019-08-30 RX ADMIN — DONEPEZIL HYDROCHLORIDE SCH MG: 10 TABLET ORAL at 09:05

## 2019-08-30 RX ADMIN — HEPARIN SODIUM SCH UNIT: 5000 INJECTION, SOLUTION INTRAVENOUS; SUBCUTANEOUS at 21:48

## 2019-08-30 RX ADMIN — METOPROLOL TARTRATE SCH MG: 25 TABLET, FILM COATED ORAL at 09:04

## 2019-08-30 RX ADMIN — MEMANTINE HYDROCHLORIDE SCH MG: 10 TABLET ORAL at 09:05

## 2019-08-30 RX ADMIN — LISINOPRIL SCH MG: 10 TABLET ORAL at 09:05

## 2019-08-30 RX ADMIN — LEVOTHYROXINE SODIUM SCH MCG: 25 TABLET ORAL at 06:15

## 2019-08-30 RX ADMIN — LISINOPRIL SCH MG: 10 TABLET ORAL at 21:48

## 2019-08-30 RX ADMIN — Medication SCH UNIT: at 09:05

## 2019-08-30 NOTE — P.PN
Subjective


Progress Note Date: 08/30/19











CHIEF COMPLAINT: Acute cholecystitis





HISTORY OF PRESENT ILLNESS: The patient is an 83-year-old female who has severe 

dementia.  Her  is at bedside. She initially presented with poor appetite

and non-STEMI. She denies any abdominal pain.





I had the opportunity to speak with her son over the phone who is also a family 

physician in [facility].  I disclosed to him that her HIDA scan is 

positive for acute cholecystitis.  On exam she denies any abdominal pain 

however.  This may reflect the severity of her dementia.  Her WBC count is 

improving on combination of Ancef, Flagyl, and Clindamycin for penicillin 

allergy.  No fevers or chills since admission.  She is still on a heparin drip 

for a mild myocardial infarction.  She has also been cleared by cardiology to 

proceed with cholecystectomy.  She recently was on Plavix less than 24 hours ago

as well as heparin gtt, where surgical intervention is now delayed at least 5 

days to decrease risk for bleeding.





ROS: No reports of nausea and vomiting. No fevers or chills.  No new chest pain.

 No productive sputum





PHYSICAL EXAM: 


VITAL SIGNS: Reviewed


CONSTITUTIONAL:  Well developed and in no acute distress. 


EYES:  Conjuctivae without sclera icterus. Extraocular movements grossly intact.




HEAD, EARS, NOSE, THROAT: Moist buccal mucosa. Head is atraumatic, 

normocephalic. Hears conversational speech. No nasal drainage.


NECK:  Supple. No thyroidomegaly.


RESPIRATORY:  Non-labored respirations and equal bilateral excursions.


CARDIOVASCULAR:  Palpable 2+ radial pulses.


ABDOMEN: Soft.  No peritonitis. Non-distended


MUSCULOSKELETAL:  No gross deformity of the lower extremities noted.  No 

clubbing.  No cyanosis.


SKIN: Good skin turgor. Well perfused. 


NEUROLOGIC: Cranial nerves I through XII grossly intact.  No focal or lateralizi

ng signs. 


PSYCH: Flat affect.  Alert to person only. 





CLINCAL LABS: White blood cell count improved from 15,000 to 11,300. LFTs are 

normal





STUDIES: I personally reviewed her HIDA scan with visualization only of the 

small bowel, no enhancement of the gallbladder


 


REPORT: 





ASSESSMENT: 


1.  Acute hydrops cholecystitis 





PLAN: 


1. I reviewed with both the patient's  including her son that she is high

perioperative risk without continued antibiotics.  


2. In the interim, patient and family wanted to be discharged home.  


3. I reviewed with them that she is extremely high risk for perforation 

especially without antibiotics.  I spoke to admitting team for arrangements 

including infectious disease consultation, PICC line if needed for IV 

antibiotics.


4. Patient may need antibiotic IV infusion including PICC line for the next 

several days should she be discharged. 





Infectious disease consultation for daily dose antibiotic also been requested.  





Otherwise if home infusion antibiotic therapy not available, recommend continued

hospitalization until earliest surgical intervention for Tuesday, September 3.





Please discontinue medications that increase risk including all NSAIDs, Plavix, 

Eliquis equivalent medications in the meantime to decrease risk for bleeding for

surgery.


She recently was on Plavix less than 24 hours ago as well as heparin where 

surgical intervention is now delayed at least 5 days to decrease risk for 

bleeding.





Critical care time over 32 minutes including coordination of care, discussion 

with consultants, review of results and plan

















Objective





- Vital Signs


Vital signs: 


                                   Vital Signs











Temp  98.2 F   08/30/19 07:40


 


Pulse  63   08/30/19 07:40


 


Resp  18   08/30/19 07:40


 


BP  99/67   08/30/19 07:40


 


Pulse Ox  95   08/30/19 07:40








                                 Intake & Output











 08/29/19 08/30/19 08/30/19





 18:59 06:59 18:59


 


Intake Total 54.839 50 100


 


Balance 54.839 50 100


 


Intake:   


 


  Intake, IV Titration 54.839 50 





  Amount   


 


    Clindamycin 600 mg In  50 





    Dextrose 5% in Water 50   





    ml @ 50 mls/hr IVPB Q8HR   





    [facility] Rx#:[id number]   


 


    Heparin Sod,Pork in 0.45% 54.839  





    NaCl 25,000 unit In 0.45   





    % NaCl 1 250ml.bag @ 12   





    UNITS/KG/HR 7.076 mls/hr   





    IV .Q24H [facility] Rx#:   





    [id number]   


 


  Other   100


 


Other:   


 


  Voiding Method Diaper Diaper 





 Incontinent Incontinent 


 


  # Voids 2 1 1


 


  # Bowel Movements   1














- Labs


CBC & Chem 7: 


                                 08/30/19 06:36





                                 08/29/19 05:59


Labs: 


                  Abnormal Lab Results - Last 24 Hours (Table)











  08/29/19 08/29/19 08/30/19 Range/Units





  11:57 14:50 06:36 


 


WBC    11.3 H  (3.8-10.6)  k/uL


 


Hgb    11.2 L  (11.4-16.0)  gm/dL


 


RDW    16.0 H  (11.5-15.5)  %


 


Neutrophils #    8.4 H  (1.3-7.7)  k/uL


 


APTT   106.1 H*   (22.0-30.0)  sec


 


POC Glucose (mg/dL)  133 H    (75-99)  mg/dL








                      Microbiology - Last 24 Hours (Table)











 08/29/19 08:55 Urine Culture - Preliminary





 Urine,Voided

## 2019-08-30 NOTE — MR
EXAMINATION TYPE: MR abdomen wo/w con

 

DATE OF EXAM: 8/30/2019

 

COMPARISON: Ultrasound dated 8/29/2019, CT scan 8/28/2019

 

HISTORY: pancreatic mass protocol

 

CONTRAST: 

Standard multiplanar, multisequence MRI departmental protocol utilizing 6 mL intravenous Gadavist ronald
olinium contrast.  

 

FINDINGS: Exam limited by motion artifact.

 

There is no evidence of pancreatic mass.

 

There is gallbladder hydrops, gallbladder wall thickening, gallbladder sludge and pericholecystic flu
id correlate for acute cholecystitis. Additionally is a questionable defect within the distal CBD whi
ch may represent a distal CBD stone. The seen T2 axial image 19. Mucosal lesion not excluded. ERCP re
commended.

 

Adrenal glands have a normal morphology. Spleen is homogeneous in signal. Simple appearing left renal
 cyst noted. No hydronephrosis. Aorta of normal caliber. Heart is enlarged. Subsegmental atelectasis 
noted both lung bases and there is a hiatal hernia.

 

There appears to be a localized soft tissue area measuring 5 mm of abnormal signal within the spinal 
canal on image 7 T2 axial the proximal level of L3-L4. Multilevel degenerative disc disease and facet
 arthropathy noted and there is slight left lateral displacement of the thecal sac at the L4-L5 level
.

 

Changes of diverticulosis of the colon noted. No definite pathologic adenopathy. Vertebral body heman
giomas are noted. Liver homogeneous in signal. Correlate for hepatic steatosis. Paraspinal soft tissu
e muscular atrophy incidentally noted

 

 

IMPRESSION:

1. Gallbladder hydrops with gallbladder sludge, wall thickening and pericholecystic fluid and a patte
rn compatible with cholecystitis. A distal CBD stone or sludge is not excluded as noted on T2 axial i
mage 19.

2. There is a suspicion for disc herniation at the T10-T11 level encroaching upon the spinal cord.

Additionally, MR lumbar spine with contrast recommended for a localized area of abnormal signal centr
ally within the spinal canal at the presumed level L3-L4. This could be artifactual but may represent
 a small extruded disc fragment or intrathecal neoplasm.

## 2019-08-30 NOTE — PN
PROGRESS NOTE



Lali is an 83-year-old lady was admitted to hospital with epigastric chest pain had

mild troponin elevation and has also possible cholecystitis.  Patient has known CAD and

had prior angioplasty.  Given the patient's advanced dementia and family pressures, we

opted to treat her CAD with medical therapy.  She is doing well and has not had any

further symptoms since admission.  She was discharged home and will undergo

cholecystectomy in the outpatient setting.



She will go home on Lopressor, Lipitor, and aspirin.  Plavix is on hold because of

surgery that needs to be done soon.



PHYSICAL EXAM:

Patient Is comfortable at rest.  Vital signs are stable.  There is no jugular venous

distention.  Chest exam reveals good air entry bilaterally.  Heart exam reveals first

and second heart sounds.  Has an ejection systolic murmur in the aortic area.

Abdomen is soft. Exam of extremities did not reveal any edema.



LABS:

Shows a hemoglobin of 11.2.



ASSESSMENT:

1. Non ST-segment elevation MI.

2. Acute cholecystitis.



PLAN:

Patient will continue medical therapy.  He is stable for discharge and is stable for

surgery on Tuesday at this time.





MMODL / IJN: [id number] / Job#: [id number]

## 2019-08-30 NOTE — P.PN
Subjective


Progress Note Date: 08/30/19


This is an 83-year-old female admitted with non-STEMI, acute cholecystitis, 

possible acute UTI, final urine culture pending and multiple other medical 

issues.  Troponins less than 0.012, 0.073, 0.103.  Echo reported normal LV 

function, EF greater than 55% . Evaluated by cardiology, medical management 

recommended.  Patient currently on heparin drip.  CT reported ill-defined 

hypodensity within the anterior head of the pancreas measuring 3.5 x 1.2 cm, 

possible neoplasm, possible pancreatitis (though lipase only a 68).,  

Diverticulosis, nonspecific right adnexal large calcification -no specific, 

moderate size hiatal hernia, Abdominal ultrasound reporting acute cholecystitis 

with dilated common bile duct, pericholecystic fluid, biliary sludge and 

gallbladder wall thickening, visualization of pancreas suboptimal, limited 

evaluation of kidneys liver aorta with left renal cyst.  Evaluated by surgery, H

TRUPTI scan ordered.  Possible UTI with urine culture pending.  Afebrile, WBC 

trending down, 15.  Denies chest pain, palpitations or shortness of breath.  

Denies abdominal pain.  Telemetry sinus bradycardia.








08/30/2019 HIDA scan consistent with acute cholecystitis and cystic duct 

obstruction.  Abdomen MR  reporting no evidence of pancreatic mass ,gallbladder 

hydrops, gallbladder sludge, thickening empiric cholecystic fluid compatible 

with cholecystitis, changes of diverticulosis of the colon noted with no 

definite pathological adenopathy, vertebral body hemangiomas, possible hepatic 

steatosis, paraspinal soft tissue muscular atrophy incidentally noted, possible 

disc herniation at T10-T11 encroaching upon spinal cord, abnormal signal 

centrall within spinal canal at presumed level L3-L4 possible small extruded 

disc fragment or intrathecal neoplasm.  Urine culture reporting gram-negative 

bacilli .Maintained on IV Rocephin.Afebrile, vital signs stable.  Denies 

abdominal pain. Denies chest pain, palpitations or shortness of breath.








Objective





- Vital Signs


Vital signs: 


                                   Vital Signs











Temp  97.7 F   08/30/19 14:38


 


Pulse  60   08/30/19 14:38


 


Resp  15   08/30/19 14:38


 


BP  101/49   08/30/19 14:38


 


Pulse Ox  96   08/30/19 14:38








                                 Intake & Output











 08/29/19 08/30/19 08/30/19





 18:59 06:59 18:59


 


Intake Total 54.839 50 720


 


Balance 54.839 50 720


 


Intake:   


 


  Intake, IV Titration 54.839 50 





  Amount   


 


    Clindamycin 600 mg In  50 





    Dextrose 5% in Water 50   





    ml @ 50 mls/hr IVPB Q8HR   





    [facility] Rx#:[id number]   


 


    Heparin Sod,Pork in 0.45% 54.839  





    NaCl 25,000 unit In 0.45   





    % NaCl 1 250ml.bag @ 12   





    UNITS/KG/HR 7.076 mls/hr   





    IV .Q24H [facility] Rx#:   





    [id number]   


 


  Oral   620


 


  Other   100


 


Other:   


 


  Voiding Method Diaper Diaper Diaper





 Incontinent Incontinent Incontinent


 


  # Voids 2 1 1


 


  # Bowel Movements   1














- Exam


PHYSICAL EXAM:


VITAL SIGNS: As above


GENERAL: Lying in bed, no acute distress,


HEENT:  Conjunctivae normal. eyes normal. 


NECK:  No JVD. No thyroid enlargement. No LNs


CARDIOVASCULAR:  S1, S2 regular.systolic murmur


RESPIRATION: Breath sounds diminished in the bases. No rhonchi or crackles. No 

wheezing.


ABDOMEN:  Soft,  nontender . No guarding. no masses palpable.  Positive Bowel 

sounds.


LEGS:  No edema. no swelling.  No calf tenderness. 


PSYCHIATRY: Alert and oriented X 2, mood and affect calm, cooperative.


NERVOUS SYSTEM:  Cranial N 2-12 grossly normal. Moves all 4 limbs.  Diffuse 

weakness, No focal deficits.  Strength and sensation grossly intact..


Skin: no lesions, no rash 











- Labs


CBC & Chem 7: 


                                 08/30/19 06:36





                                 08/29/19 05:59


Labs: 


                  Abnormal Lab Results - Last 24 Hours (Table)











  08/30/19 Range/Units





  06:36 


 


WBC  11.3 H  (3.8-10.6)  k/uL


 


Hgb  11.2 L  (11.4-16.0)  gm/dL


 


RDW  16.0 H  (11.5-15.5)  %


 


Neutrophils #  8.4 H  (1.3-7.7)  k/uL








                      Microbiology - Last 24 Hours (Table)











 08/29/19 08:55 Urine Culture - Preliminary





 Urine,Voided    Gram Neg Bacilli














Assessment and Plan


Assessment: 


Active non-STEMI


-Acute cholecystitis


-Leukocytosis secondary to the above


-Acute UTI, gram-negative bacilli


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 

pancreatic mass


-CAD with history of stent 


-Hypertension


-Alzheimer's dementia


-Diabetes mellitus


-Dyslipidemia


-Atelectasis


-possible disc herniation at T10-T11 encroaching upon spinal cord, abnormal 

signal centrall within spinal canal at presumed level L3-L4 possible small 

extruded disc fragment or intrathecal neoplasm per MR


-paraspinal soft tissue muscular atrophy incidentally noted on MR


-Diverticulosis changes, further follow-up with GI outpatient


-Possible hepatic steatosis




















Plan: Continue on current medication regime ,monitoring and symptomatic 

treatment.  Infectious disease consulted.  Maintain IV antibiotics of Rocephin 

.final urine culture pending .Cholecystectomy is planned for Tuesday.  

Maintained on statin, aspirin, ACE inhibitor and beta blocker.  DVT prophylaxis 

with heparin subcu as per cardiology.  GI prophylaxis in place.  Patient and 

spouse updated on plan of care, both verbalize understanding of and agreement wi

.  Further recommendations to follow.




















The impression and plan of care has been dictated as directed.





:


I performed a history and examination of this patient,  discussed the same with 

the dictator.  I agree with the dictator's note ,documented as a scribe.  Any 

additional findings or plans will be noted.

## 2019-08-31 LAB
BASOPHILS # BLD AUTO: 0 K/UL (ref 0–0.2)
BASOPHILS NFR BLD AUTO: 0 %
EOSINOPHIL # BLD AUTO: 0.4 K/UL (ref 0–0.7)
EOSINOPHIL NFR BLD AUTO: 4 %
ERYTHROCYTE [DISTWIDTH] IN BLOOD BY AUTOMATED COUNT: 4.27 M/UL (ref 3.8–5.4)
ERYTHROCYTE [DISTWIDTH] IN BLOOD: 15.9 % (ref 11.5–15.5)
HCT VFR BLD AUTO: 35.3 % (ref 34–46)
HGB BLD-MCNC: 11.4 GM/DL (ref 11.4–16)
LYMPHOCYTES # SPEC AUTO: 2.4 K/UL (ref 1–4.8)
LYMPHOCYTES NFR SPEC AUTO: 26 %
MCH RBC QN AUTO: 26.8 PG (ref 25–35)
MCHC RBC AUTO-ENTMCNC: 32.4 G/DL (ref 31–37)
MCV RBC AUTO: 82.7 FL (ref 80–100)
MONOCYTES # BLD AUTO: 0.4 K/UL (ref 0–1)
MONOCYTES NFR BLD AUTO: 4 %
NEUTROPHILS # BLD AUTO: 5.9 K/UL (ref 1.3–7.7)
NEUTROPHILS NFR BLD AUTO: 65 %
PLATELET # BLD AUTO: 200 K/UL (ref 150–450)
WBC # BLD AUTO: 9.1 K/UL (ref 3.8–10.6)

## 2019-08-31 RX ADMIN — Medication SCH UNIT: at 08:47

## 2019-08-31 RX ADMIN — HEPARIN SODIUM SCH UNIT: 5000 INJECTION, SOLUTION INTRAVENOUS; SUBCUTANEOUS at 20:39

## 2019-08-31 RX ADMIN — DONEPEZIL HYDROCHLORIDE SCH MG: 10 TABLET ORAL at 08:47

## 2019-08-31 RX ADMIN — LEVOTHYROXINE SODIUM SCH MCG: 25 TABLET ORAL at 06:17

## 2019-08-31 RX ADMIN — ASPIRIN 81 MG CHEWABLE TABLET SCH MG: 81 TABLET CHEWABLE at 08:47

## 2019-08-31 RX ADMIN — METOPROLOL TARTRATE SCH: 25 TABLET, FILM COATED ORAL at 20:53

## 2019-08-31 RX ADMIN — ATORVASTATIN CALCIUM SCH: 40 TABLET, FILM COATED ORAL at 20:53

## 2019-08-31 RX ADMIN — LISINOPRIL SCH MG: 10 TABLET ORAL at 08:47

## 2019-08-31 RX ADMIN — METOPROLOL TARTRATE SCH MG: 25 TABLET, FILM COATED ORAL at 08:47

## 2019-08-31 RX ADMIN — LISINOPRIL SCH MG: 10 TABLET ORAL at 20:39

## 2019-08-31 RX ADMIN — MEMANTINE HYDROCHLORIDE SCH MG: 10 TABLET ORAL at 08:47

## 2019-08-31 RX ADMIN — MEMANTINE HYDROCHLORIDE SCH MG: 10 TABLET ORAL at 20:39

## 2019-08-31 RX ADMIN — HEPARIN SODIUM SCH UNIT: 5000 INJECTION, SOLUTION INTRAVENOUS; SUBCUTANEOUS at 08:46

## 2019-08-31 RX ADMIN — PANTOPRAZOLE SODIUM SCH MG: 40 INJECTION, POWDER, FOR SOLUTION INTRAVENOUS at 08:46

## 2019-08-31 NOTE — PN
PROGRESS NOTE



DATE OF SERVICE:

08/31/2019



REASON FOR FOLLOWUP:

Acute cholecystitis.



INTERVAL HISTORY:

The patient is currently afebrile.  Patient has been breathing comfortably.  No nausea,

no vomiting has been reported by the family at the bedside.  The patient herself denies

any active symptoms.



PHYSICAL EXAMINATION:

On examination, her blood pressure is 133/60 with a pulse of 81, temperature 97.7.  She

is 95% on room air.  General description is an elderly female up in the chair in no

distress.  Respiratory system: Unlabored breathing, clear to auscultation anteriorly.

Heart S1, S2.  Regular rate and rhythm.  Abdomen is soft, no tenderness.



LABS:

Hemoglobin is 11.4, white count 9.1.



DIAGNOSTIC IMPRESSION AND PLAN:

Patient with acute cholecystitis.  The patient's fever has resolved. White count

normalized.  Currently on Rocephin and Flagyl.  Waiting for the cholecystectomy Tuesday

and continue supportive care.





MMODL / IJN: [id number] / Job#: [id number]

## 2019-08-31 NOTE — P.PN
Subjective


Progress Note Date: 08/31/19


Principal diagnosis: 





Cholecystitis





Patient remains pleasantly confused.  Denies pain.  Per the  is not 

eating much.  He agrees she appears comfortable.  T-max 99.  White blood cell 

count 9.1.  No CMP from today.





Objective





- Vital Signs


Vital signs: 


                                   Vital Signs











Temp  97.7 F   08/31/19 06:57


 


Pulse  61   08/31/19 06:57


 


Resp  17   08/31/19 06:57


 


BP  133/60   08/31/19 06:57


 


Pulse Ox  95   08/31/19 06:57








                                 Intake & Output











 08/30/19 08/31/19 08/31/19





 18:59 06:59 18:59


 


Intake Total 1040  


 


Balance 1040  


 


Weight  42 kg 


 


Intake:   


 


  Oral 940  


 


  Other 100  


 


Other:   


 


  Voiding Method Diaper Diaper Diaper





 Incontinent Incontinent Incontinent


 


  # Voids 1 1 


 


  # Bowel Movements 1 1 














- Exam





Abdomen: Soft, nondistended, nontender





- Labs


CBC & Chem 7: 


                                 08/31/19 06:43





                                 08/29/19 05:59


Labs: 


                  Abnormal Lab Results - Last 24 Hours (Table)











  08/31/19 Range/Units





  06:43 


 


RDW  15.9 H  (11.5-15.5)  %








                      Microbiology - Last 24 Hours (Table)











 08/29/19 08:55 Urine Culture - Preliminary





 Urine,Voided    Gram Neg Bacilli














Assessment and Plan


(1) Acute cholecystitis


Narrative/Plan: 


Patient with acute cholecystitis and acute MI.  IV anticoagulation per 

cardiology.  Advance diet to low-fat to give the patient more options.  Continue

antibiotics per infectious disease.  Continue plan for cholecystectomy on 

Tuesday at this time.


Current Visit: Yes   Status: Acute   Code(s): K81.0 - ACUTE CHOLECYSTITIS   

SNOMED Code(s): 92670943

## 2019-08-31 NOTE — P.CONS
History of Present Illness





- Reason for Consult


Consult date: 19


acute cholecystitis and antibiotic recommendation


Requesting physician: Geremias Quiñones





- Chief Complaint


chest pain and vomiting 1 day





- History of Present Illness





patient is 83-year-old   female with a past medical history  

significant for dementia has been brought into the ER by the  with 

concern for possible chest pain, apparently the patient was putting her hand on 

the chest/upper abdominal area and did have an episode of vomiting before the 

patient was brought into the hospital patient on arrival to the ER and was 

afebrile did have elevated white count CT of the chest abdominal pelvis reported

negative by radiology, subsequently did have a ultrasound which was suspicious 

for acute cholecystitis which for that has been confirmed on HIDA scan and an 

MRI of the abdomen today, because of her penicillin LLERGY she has been treated 

with cefazolin and clindamycin Flagyl infectious disease was consulted today for

further recommendation regarding antibiotic


 most of the information has been obtained from review of the chart and talking 

to the husbandAs the patient currently unable to  provide any history





Review of Systems





Positive points has been mentioned in HPI complete review could not be obtained 

because of the patient mental status








Past Medical History


Past Medical History: Dementia, Hyperlipidemia, Hypertension, Osteoarthritis 

(OA), Thyroid Disorder


Additional Past Medical History / Comment(s): ALZHEIMER'S


History of Any Multi-Drug Resistant Organisms: None Reported


Past Surgical History: Heart Catheterization With Stent, Tonsillectomy


Additional Past Surgical History / Comment(s): cataract surgery, colonoscopy,


Past Anesthesia/Blood Transfusion Reactions: No Reported Reaction


Date of Last Stent Placement:: 


Past Psychological History: No Psychological Hx Reported


Smoking Status: Never smoker


Past Alcohol Use History: None Reported


Past Drug Use History: None Reported





- Past Family History


  ** Mother


Family Medical History: Asthma





  ** Father


Family Medical History: Deep Vein Thrombosis (DVT), Myocardial Infarction (MI)


Additional Family Medical History / Comment(s):  at 59





  ** Sister(s)


Family Medical History: Cancer


Additional Family Medical History / Comment(s): 2 SISTERS HAD CANCER





Medications and Allergies


                                Home Medications











 Medication  Instructions  Recorded  Confirmed  Type


 


Donepezil [Aricept] 10 mg PO DAILY 14 History


 


Levothyroxine Sodium [Synthroid] 25 mcg PO DAILY 14 History


 


Atorvastatin [Lipitor] 80 mg PO HS #30 tab 14 Rx


 


Enalapril [Vasotec] 5 mg PO BID #60 tab 14 Rx


 


Metoprolol Tartrate [Lopressor] 25 mg PO BID #60 tab 14 Rx


 


Nitroglycerin Sl Tabs [Nitrostat] 0.4 mg SUBLINGUAL Q5M PRN #25 tab 14 Rx


 


Aspirin EC [Ecotrin Low Dose] 81 mg PO DAILY 16 History


 


Cholecalciferol [Vitamin D3] 1,000 unit PO DAILY 16 History


 


Diclofenac Sodium [Voltaren] 50 mg PO DAILY 16 History


 


Memantine [Namenda] 10 mg PO BID 19 History


 


cefTRIAXone [Rocephin] 2,000 mg IVP Q24HR #7 vial 19  Rx








                                    Allergies











Allergy/AdvReac Type Severity Reaction Status Date / Time


 


Penicillins Allergy  Unknown Verified 19 13:02


 


propoxyphene HCl Allergy  Confusion Verified 19 13:02





[From [facility]]     














Physical Exam


Vitals: 


                                   Vital Signs











  Temp Pulse Resp BP BP Pulse Ox


 


 19 11:29  98.0 F  57 L  18  106/71   97


 


 19 07:40  98.2 F  63  18  99/67   95


 


 19 04:00  98.1 F  65  16  103/62   94 L


 


 19 00:00  97.2 F L  63  16  104/62   95


 


 19 20:57   71  18   159/85 


 


 19 16:41    17   


 


 19 16:00  98.2 F  53 L  17   118/79  96








                                Intake and Output











 19





 22:59 06:59 14:59


 


Intake Total 54.839 50 720


 


Balance 54.839 50 720


 


Intake:   


 


  Intake, IV Titration 54.839 50 





  Amount   


 


    Clindamycin 600 mg In  50 





    Dextrose 5% in Water 50   





    ml @ 50 mls/hr IVPB Q8HR   





    [facility] Rx#:[id number]   


 


    Heparin Sod,Pork in 0.45% 54.839  





    NaCl 25,000 unit In 0.45   





    % NaCl 1 250ml.bag @ 12   





    UNITS/KG/HR 7.076 mls/hr   





    IV .Q24H [facility] Rx#:   





    [id number]   


 


  Oral   620


 


  Other   100


 


Other:   


 


  Voiding Method Diaper Diaper Diaper





 Incontinent Incontinent Incontinent


 


  # Voids 1 1 1


 


  # Bowel Movements   1














GENERAL DESCRIPTION: elderly female lying in bed, no distress. No tachypnea or 

accessory muscle of respiration use.


HEENT: Shows Pallor , no scleral icterus. Oral mucous membrane is dry. No 

pharyngeal erythema or thrush


NECK: Trachea central, no thyromegaly.


LUNGS: Unlabored breathing. Clear to auscultation anteriorly. No wheeze or 

crackle.


HEART: S1, S2, regular rate and rhythm. No loud murmur


ABDOMEN: Soft, right upper quadrant tenderness , no guarding or rigidity, no 

organomegaly


EXTREMITIES: No edema of feet.


SKIN: No rash, no masses palpable.


NEUROLOGICAL: The patient is awake, alert, oriented x1, mood and affect normal.














Results


CBC & Chem 7: 


                                 19 06:36





                                 19 05:59


Labs: 


                  Abnormal Lab Results - Last 24 Hours (Table)











  19 Range/Units





  14:50 06:36 


 


WBC   11.3 H  (3.8-10.6)  k/uL


 


Hgb   11.2 L  (11.4-16.0)  gm/dL


 


RDW   16.0 H  (11.5-15.5)  %


 


Neutrophils #   8.4 H  (1.3-7.7)  k/uL


 


APTT  106.1 H*   (22.0-30.0)  sec








                      Microbiology - Last 24 Hours (Table)











 19 08:55 Urine Culture - Preliminary





 Urine,Voided 














Assessment and Plan


Assessment: 





1-patient presenting to the hospital with abdominal pain and vomiting now has 

been diagnosed with acute  cholecystitis, unfortunately patient could not be 

taken for surgery as she has received Plavix with initial concern for possible 

MI, we will need to cover for the enteric gram-negative to be the likely 

pathogen associated acute cholecystitis


2-penicillin ALLERGY that would limit the number of antibiotics safe to use


(1) Acute cholecystitis


Current Visit: Yes   Status: Acute   Code(s): K81.0 - ACUTE CHOLECYSTITIS   

SNOMED Code(s): 05473299


   





(2) Leukocytosis


Current Visit: Yes   Status: Acute   Code(s): D72.829 - ELEVATED WHITE BLOOD 

CELL COUNT, UNSPECIFIED   SNOMED Code(s): 822776004


   


Plan: 





1-discontinue the cefazolin and clindamycin


2-start the patient on Rocephin 2 g  daily and continue with the Flagyl


3-gentle IV fluid


we will follow on clinical condition and culture to further adjust medication if

needed


Thank you for this consultation will follow this patient along with you


Time with Patient: Greater than 30

## 2019-09-01 LAB
ALBUMIN SERPL-MCNC: 3.2 G/DL (ref 3.5–5)
ALP SERPL-CCNC: 101 U/L (ref 38–126)
ALT SERPL-CCNC: 17 U/L (ref 9–52)
ANION GAP SERPL CALC-SCNC: 8 MMOL/L
AST SERPL-CCNC: 16 U/L (ref 14–36)
BASOPHILS # BLD AUTO: 0 K/UL (ref 0–0.2)
BASOPHILS NFR BLD AUTO: 1 %
BUN SERPL-SCNC: 14 MG/DL (ref 7–17)
CALCIUM SPEC-MCNC: 8.6 MG/DL (ref 8.4–10.2)
CHLORIDE SERPL-SCNC: 111 MMOL/L (ref 98–107)
CO2 SERPL-SCNC: 26 MMOL/L (ref 22–30)
EOSINOPHIL # BLD AUTO: 0.3 K/UL (ref 0–0.7)
EOSINOPHIL NFR BLD AUTO: 4 %
ERYTHROCYTE [DISTWIDTH] IN BLOOD BY AUTOMATED COUNT: 4.28 M/UL (ref 3.8–5.4)
ERYTHROCYTE [DISTWIDTH] IN BLOOD: 15.5 % (ref 11.5–15.5)
GLUCOSE SERPL-MCNC: 120 MG/DL (ref 74–99)
HCT VFR BLD AUTO: 35.6 % (ref 34–46)
HGB BLD-MCNC: 11.6 GM/DL (ref 11.4–16)
LYMPHOCYTES # SPEC AUTO: 2.6 K/UL (ref 1–4.8)
LYMPHOCYTES NFR SPEC AUTO: 31 %
MAGNESIUM SPEC-SCNC: 2 MG/DL (ref 1.6–2.3)
MCH RBC QN AUTO: 27 PG (ref 25–35)
MCHC RBC AUTO-ENTMCNC: 32.5 G/DL (ref 31–37)
MCV RBC AUTO: 83.1 FL (ref 80–100)
MONOCYTES # BLD AUTO: 0.4 K/UL (ref 0–1)
MONOCYTES NFR BLD AUTO: 4 %
NEUTROPHILS # BLD AUTO: 5 K/UL (ref 1.3–7.7)
NEUTROPHILS NFR BLD AUTO: 59 %
PLATELET # BLD AUTO: 223 K/UL (ref 150–450)
POTASSIUM SERPL-SCNC: 4 MMOL/L (ref 3.5–5.1)
PROT SERPL-MCNC: 5.8 G/DL (ref 6.3–8.2)
SODIUM SERPL-SCNC: 145 MMOL/L (ref 137–145)
TRIGL SERPL-MCNC: 164 MG/DL (ref ?–150)
WBC # BLD AUTO: 8.4 K/UL (ref 3.8–10.6)

## 2019-09-01 RX ADMIN — METRONIDAZOLE SCH MLS/HR: 500 INJECTION, SOLUTION INTRAVENOUS at 09:40

## 2019-09-01 RX ADMIN — PANTOPRAZOLE SODIUM SCH MG: 40 INJECTION, POWDER, FOR SOLUTION INTRAVENOUS at 09:39

## 2019-09-01 RX ADMIN — Medication SCH: at 07:32

## 2019-09-01 RX ADMIN — ASPIRIN 81 MG CHEWABLE TABLET SCH: 81 TABLET CHEWABLE at 07:31

## 2019-09-01 RX ADMIN — POTASSIUM CHLORIDE SCH: 14.9 INJECTION, SOLUTION INTRAVENOUS at 09:40

## 2019-09-01 RX ADMIN — METOPROLOL TARTRATE SCH: 25 TABLET, FILM COATED ORAL at 07:32

## 2019-09-01 RX ADMIN — LEUCINE, PHENYLALANINE, LYSINE, METHIONINE, ISOLEUCINE, VALINE, HISTIDINE, THREONINE, TRYPTOPHAN, ALANINE, GLYCINE, ARGININE, PROLINE, SERINE, TYROSINE, SODIUM ACETATE, DIBASIC POTASSIUM PHOSPHATE, MAGNESIUM CHLORIDE, SODIUM CHLORIDE, CALCIUM CHLORIDE, DEXTROSE SCH MLS/HR
311; 238; 247; 170; 255; 247; 204; 179; 77; 880; 438; 489; 289; 213; 17; 297; 261; 51; 77; 33; 10 INJECTION INTRAVENOUS at 21:13

## 2019-09-01 RX ADMIN — LEVOTHYROXINE SODIUM SCH: 25 TABLET ORAL at 06:25

## 2019-09-01 RX ADMIN — ATORVASTATIN CALCIUM SCH: 40 TABLET, FILM COATED ORAL at 20:42

## 2019-09-01 RX ADMIN — INSULIN ASPART SCH: 100 INJECTION, SOLUTION INTRAVENOUS; SUBCUTANEOUS at 17:29

## 2019-09-01 RX ADMIN — DONEPEZIL HYDROCHLORIDE SCH: 10 TABLET ORAL at 07:32

## 2019-09-01 RX ADMIN — METOPROLOL TARTRATE SCH: 25 TABLET, FILM COATED ORAL at 20:42

## 2019-09-01 RX ADMIN — HEPARIN SODIUM SCH: 5000 INJECTION, SOLUTION INTRAVENOUS; SUBCUTANEOUS at 18:43

## 2019-09-01 RX ADMIN — SOYBEAN OIL SCH MLS/HR: 20 INJECTION, SOLUTION INTRAVENOUS at 21:14

## 2019-09-01 RX ADMIN — METRONIDAZOLE SCH MLS/HR: 500 INJECTION, SOLUTION INTRAVENOUS at 15:04

## 2019-09-01 RX ADMIN — LISINOPRIL SCH: 10 TABLET ORAL at 07:32

## 2019-09-01 RX ADMIN — MEMANTINE HYDROCHLORIDE SCH: 10 TABLET ORAL at 07:32

## 2019-09-01 RX ADMIN — MEMANTINE HYDROCHLORIDE SCH: 10 TABLET ORAL at 20:42

## 2019-09-01 RX ADMIN — HEPARIN SODIUM SCH UNIT: 5000 INJECTION, SOLUTION INTRAVENOUS; SUBCUTANEOUS at 09:39

## 2019-09-01 RX ADMIN — LISINOPRIL SCH: 10 TABLET ORAL at 20:42

## 2019-09-01 NOTE — P.PN
Subjective


Progress Note Date: 09/01/19


Principal diagnosis: 





Cholecystitis





Patient comfortably confused.  Denies pain.  Nursing states that she ate fairly 

well yesterday however today she is not following commands when asked to swallow

and there was concern for possible aspiration with liquids.  Labs appear normal.

 She is afebrile.





Objective





- Vital Signs


Vital signs: 


                                   Vital Signs











Temp  97.9 F   09/01/19 06:48


 


Pulse  71   09/01/19 06:48


 


Resp  17   09/01/19 06:48


 


BP  120/59   09/01/19 06:48


 


Pulse Ox  95   09/01/19 06:48








                                 Intake & Output











 08/31/19 09/01/19 09/01/19





 18:59 06:59 18:59


 


Weight 42 kg 55.1 kg 


 


Other:   


 


  Voiding Method Diaper Diaper Diaper





 Incontinent Incontinent Incontinent


 


  # Voids 1 1 


 


  # Bowel Movements  2 














- Exam





Abdomen: Soft, nondistended, nontender





- Labs


CBC & Chem 7: 


                                 09/01/19 06:37





                                 09/01/19 06:37


Labs: 


                  Abnormal Lab Results - Last 24 Hours (Table)











  09/01/19 Range/Units





  06:37 


 


Chloride  111 H  ()  mmol/L


 


Glucose  120 H  (74-99)  mg/dL


 


Total Protein  5.8 L  (6.3-8.2)  g/dL


 


Albumin  3.2 L  (3.5-5.0)  g/dL














Assessment and Plan


(1) Acute cholecystitis


Narrative/Plan: 


Patient with clinical suspicion for aspiration today.  We'll start PPN for now. 

Continue IV antibiotics.  Tentatively plan cholecystectomy Tuesday.


Current Visit: Yes   Status: Acute   Code(s): K81.0 - ACUTE CHOLECYSTITIS   

SNOMED Code(s): 31676404

## 2019-09-01 NOTE — PN
PROGRESS NOTE



DATE OF SERVICE:

8/31/2019



SUBJECTIVE:

83-year-old white female with chest pain, acute nausea, vomiting, acute cholecystitis.

She is on IV antibiotics.  Awaiting for surgery on Tuesday.  Blood thinners have been

withheld partial.  She is feeling better.  No abdominal pain.



CARDIOVASCULAR: S1, S2.

LUNGS: Clear.



Possible aspiration.  She may be started on PPN.  Continue with IV antibiotics.  Await

for surgery on Tuesday.





MMODL / IJN: [id number] / Job#: [id number]

## 2019-09-01 NOTE — PN
PROGRESS NOTE



SUBJECTIVE:

83-year-old white female with acute cholecystitis, acute atypical chest pain, abdominal

pain secondary to cholecystitis.  Has had broad-spectrum antibiotics until (  ).

Surgery is planned for Tuesday.



Cardiovascular S1/S2.  Lungs clear.  GI is soft. Hematology negative Homans.



ASSESSMENT:

Acute cholecystitis, choledocholithiasis, atypical chest pain, acute nausea, vomiting.

Continue current treatments with IV antibiotics.  Surgery is planned for Tuesday.

Continue on broad-spectrum antibiotics.  Monitor electrolytes in the meantime.





MMODL / IJN: [id number] / Job#: [id number]

## 2019-09-01 NOTE — PN
PROGRESS NOTE



DATE OF SERVICE:

09/01/2019



REASON FOR FOLLOWUP:

Acute cholecystitis.



INTERVAL HISTORY:

The patient is currently afebrile.  The patient has been more awake, alert.  She is

breathing comfortably.  Denies further nausea, no vomiting.  No chest pain, shortness

of breath or cough.



PHYSICAL EXAMINATION:

Blood pressure is 140/60 with a pulse of 59, temperature 98.5, she is 97% on room air.

General description is an elderly female, lying in bed in no distress.  No tachypnea or

accessory muscle of respiration use.  Respiratory system:  Unlabored breathing, clear

to auscultation anteriorly.  Heart S1, S2.  Regular rate and rhythm. Abdomen soft, no

tenderness. Extremities, no edema of the feet.



LABS:

Hemoglobin 11.6, white count 8.4, BUN of 14, creatinine 0.97.  Urine is gram-negative

bacilli.



DIAGNOSTIC IMPRESSION AND PLAN:

Patient with acute cholecystitis.  Currently on IV Rocephin and Flagyl to continue.

Waiting for the cholecystectomy on Tuesday.  Family at the bedside.  Questions were

answered.





MMODL / IJN: [id number] / Job#: [id number]

## 2019-09-02 LAB
ANION GAP SERPL CALC-SCNC: 7 MMOL/L
BUN SERPL-SCNC: 13 MG/DL (ref 7–17)
CALCIUM SPEC-MCNC: 8.1 MG/DL (ref 8.4–10.2)
CHLORIDE SERPL-SCNC: 111 MMOL/L (ref 98–107)
CO2 SERPL-SCNC: 24 MMOL/L (ref 22–30)
GLUCOSE BLD-MCNC: 137 MG/DL (ref 75–99)
GLUCOSE BLD-MCNC: 152 MG/DL (ref 75–99)
GLUCOSE BLD-MCNC: 169 MG/DL (ref 75–99)
GLUCOSE BLD-MCNC: 169 MG/DL (ref 75–99)
GLUCOSE SERPL-MCNC: 158 MG/DL (ref 74–99)
MAGNESIUM SPEC-SCNC: 1.8 MG/DL (ref 1.6–2.3)
POTASSIUM SERPL-SCNC: 3.3 MMOL/L (ref 3.5–5.1)
SODIUM SERPL-SCNC: 142 MMOL/L (ref 137–145)

## 2019-09-02 RX ADMIN — LEVOTHYROXINE SODIUM SCH: 25 TABLET ORAL at 05:56

## 2019-09-02 RX ADMIN — POTASSIUM CHLORIDE SCH MLS/HR: 7.46 INJECTION, SOLUTION INTRAVENOUS at 14:39

## 2019-09-02 RX ADMIN — Medication SCH: at 09:09

## 2019-09-02 RX ADMIN — METOPROLOL TARTRATE SCH: 25 TABLET, FILM COATED ORAL at 20:13

## 2019-09-02 RX ADMIN — DONEPEZIL HYDROCHLORIDE SCH: 10 TABLET ORAL at 09:09

## 2019-09-02 RX ADMIN — SOYBEAN OIL SCH MLS/HR: 20 INJECTION, SOLUTION INTRAVENOUS at 16:56

## 2019-09-02 RX ADMIN — LEUCINE, PHENYLALANINE, LYSINE, METHIONINE, ISOLEUCINE, VALINE, HISTIDINE, THREONINE, TRYPTOPHAN, ALANINE, GLYCINE, ARGININE, PROLINE, SERINE, TYROSINE, SODIUM ACETATE, DIBASIC POTASSIUM PHOSPHATE, MAGNESIUM CHLORIDE, SODIUM CHLORIDE, CALCIUM CHLORIDE, DEXTROSE SCH MLS/HR
311; 238; 247; 170; 255; 247; 204; 179; 77; 880; 438; 489; 289; 213; 17; 297; 261; 51; 77; 33; 10 INJECTION INTRAVENOUS at 15:57

## 2019-09-02 RX ADMIN — INSULIN ASPART SCH UNIT: 100 INJECTION, SOLUTION INTRAVENOUS; SUBCUTANEOUS at 11:46

## 2019-09-02 RX ADMIN — METRONIDAZOLE SCH MLS/HR: 500 INJECTION, SOLUTION INTRAVENOUS at 09:59

## 2019-09-02 RX ADMIN — INSULIN ASPART SCH: 100 INJECTION, SOLUTION INTRAVENOUS; SUBCUTANEOUS at 00:06

## 2019-09-02 RX ADMIN — POTASSIUM CHLORIDE SCH MLS/HR: 7.46 INJECTION, SOLUTION INTRAVENOUS at 13:05

## 2019-09-02 RX ADMIN — LISINOPRIL SCH: 10 TABLET ORAL at 20:13

## 2019-09-02 RX ADMIN — MEMANTINE HYDROCHLORIDE SCH: 10 TABLET ORAL at 09:10

## 2019-09-02 RX ADMIN — POTASSIUM CHLORIDE SCH MLS/HR: 7.46 INJECTION, SOLUTION INTRAVENOUS at 11:21

## 2019-09-02 RX ADMIN — HEPARIN SODIUM SCH UNIT: 5000 INJECTION, SOLUTION INTRAVENOUS; SUBCUTANEOUS at 20:32

## 2019-09-02 RX ADMIN — HEPARIN SODIUM SCH UNIT: 5000 INJECTION, SOLUTION INTRAVENOUS; SUBCUTANEOUS at 09:09

## 2019-09-02 RX ADMIN — METRONIDAZOLE SCH MLS/HR: 500 INJECTION, SOLUTION INTRAVENOUS at 23:59

## 2019-09-02 RX ADMIN — PANTOPRAZOLE SODIUM SCH MG: 40 INJECTION, POWDER, FOR SOLUTION INTRAVENOUS at 10:01

## 2019-09-02 RX ADMIN — INSULIN ASPART SCH UNIT: 100 INJECTION, SOLUTION INTRAVENOUS; SUBCUTANEOUS at 05:55

## 2019-09-02 RX ADMIN — METRONIDAZOLE SCH MLS/HR: 500 INJECTION, SOLUTION INTRAVENOUS at 00:07

## 2019-09-02 RX ADMIN — ATORVASTATIN CALCIUM SCH: 40 TABLET, FILM COATED ORAL at 20:13

## 2019-09-02 RX ADMIN — LISINOPRIL SCH: 10 TABLET ORAL at 09:10

## 2019-09-02 RX ADMIN — ASPIRIN 81 MG CHEWABLE TABLET SCH: 81 TABLET CHEWABLE at 09:09

## 2019-09-02 RX ADMIN — METOPROLOL TARTRATE SCH: 25 TABLET, FILM COATED ORAL at 09:10

## 2019-09-02 RX ADMIN — POTASSIUM CHLORIDE SCH MLS/HR: 7.46 INJECTION, SOLUTION INTRAVENOUS at 16:56

## 2019-09-02 RX ADMIN — METRONIDAZOLE SCH MLS/HR: 500 INJECTION, SOLUTION INTRAVENOUS at 15:42

## 2019-09-02 RX ADMIN — POTASSIUM CHLORIDE SCH: 14.9 INJECTION, SOLUTION INTRAVENOUS at 05:56

## 2019-09-02 RX ADMIN — MEMANTINE HYDROCHLORIDE SCH: 10 TABLET ORAL at 20:13

## 2019-09-02 RX ADMIN — INSULIN ASPART SCH UNIT: 100 INJECTION, SOLUTION INTRAVENOUS; SUBCUTANEOUS at 18:46

## 2019-09-02 NOTE — PN
PROGRESS NOTE



DATE OF SERVICE:

09/02/2019



REASON FOR FOLLOWUP:

Acute cholecystitis.



INTERVAL HISTORY:

The patient is currently afebrile.  The patient has been breathing comfortably.  Denies

having any chest pain.  No cough.  No nausea, no vomiting.  No abdominal pain, no

diarrhea.



PHYSICAL EXAMINATION:

Blood pressure 168/82 with a pulse of 53, temperature 98.4, she is 94% on room air.

General description is an elderly female lying in bed in no distress.  Respiratory

system: Unlabored breathing, clear to auscultation anteriorly.  Heart S1, S2.  Regular

rate and rhythm.  Abdomen soft, no tenderness.



LABS:

BUN of 13, creatinine 0.82.



DIAGNOSTIC IMPRESSION AND PLAN:

Patient with acute cholecystitis.  The patient is currently afebrile. White count is

normal.  Blood culture negative.  Continue Rocephin and Flagyl. Scheduled for

cholecystectomy tomorrow.  Continue supportive care.





MMODL / IJN: [id number] / Job#: [id number]

## 2019-09-02 NOTE — PN
PROGRESS NOTE



HISTORY:

This is a white female who was admitted for acute chest pain and abdominal pain. Found

to have cholecystitis.  She will need surgery tomorrow for cholecystectomy.  Remains on

Rocephin.  Clinically, she is stable, having no chest pain or shortness of breath.

Potassium is a bit low. She will have replacement today.  Sugars in the mid 100s.

Calcium level is a little bit low.



ASSESSMENT:

1. Acute cholecystitis.

2. Acute abdominal pain/chest pain.

3. Hypokalemia.

4. Dementia.

5. Hypertension.

6. Hypothyroidism.

7. Diabetes mellitus.



PLAN:

Continue current treatments, prophylactic antibiotics, cholecystectomy in the morning

and possible discharge in next day or two.





MMODL / IJN: [id number] / Job#: [id number]

## 2019-09-02 NOTE — P.PN
Subjective


Progress Note Date: 09/02/19


Principal diagnosis: 





Cholecystitis





Patient doing well today.  She was started on PPN yesterday given her issues 

related to possible aspiration.  Denies pain.  Pleasantly confused.





Objective





- Vital Signs


Vital signs: 


                                   Vital Signs











Temp  98.9 F   09/02/19 08:05


 


Pulse  57 L  09/02/19 08:05


 


Resp  16   09/02/19 08:05


 


BP  127/69   09/02/19 08:05


 


Pulse Ox  95   09/02/19 08:05








                                 Intake & Output











 09/01/19 09/02/19 09/02/19





 18:59 06:59 18:59


 


Intake Total 550  


 


Balance 550  


 


Weight 55.1 kg 55.8 kg 


 


Intake:   


 


  Intake, IV Titration 550  





  Amount   


 


    cefTRIAXone 2 gm In 50  





    Sodium Chloride 0.9% 50   





    ml @ 100 mls/hr IVPB   





    Q24HR [facility] Rx#:[id number]   


 


    metroNIDAZOLE-NS  500  





    mg In Saline 1 100ml.bag   





    @ 100 mls/hr IVPB Q8HR   





    [facility] Rx#:[id number]   


 


Other:   


 


  Voiding Method Diaper Incontinent 





 Incontinent  


 


  # Voids 3 2 


 


  # Bowel Movements 1 1 1














- Exam





Abdomen: Soft, nontender, nondistended





- Labs


CBC & Chem 7: 


                                 09/01/19 06:37





                                 09/02/19 06:42


Labs: 


                  Abnormal Lab Results - Last 24 Hours (Table)











  09/01/19 09/02/19 09/02/19 Range/Units





  06:37 00:02 05:47 


 


Potassium     (3.5-5.1)  mmol/L


 


Chloride     ()  mmol/L


 


Glucose     (74-99)  mg/dL


 


POC Glucose (mg/dL)   137 H  169 H  (75-99)  mg/dL


 


Calcium     (8.4-10.2)  mg/dL


 


Triglycerides  164 H    (<150)  mg/dL














  09/02/19 09/02/19 Range/Units





  06:42 11:39 


 


Potassium  3.3 L   (3.5-5.1)  mmol/L


 


Chloride  111 H   ()  mmol/L


 


Glucose  158 H   (74-99)  mg/dL


 


POC Glucose (mg/dL)   152 H  (75-99)  mg/dL


 


Calcium  8.1 L   (8.4-10.2)  mg/dL


 


Triglycerides    (<150)  mg/dL














Assessment and Plan


(1) Acute cholecystitis


Narrative/Plan: 


Continue IV antibiotics.  Continue nothing by mouth for now.  Continue PPN.  

Arrangements for laparoscopic cholecystectomy made for tomorrow.  Family 

updated.


Current Visit: Yes   Status: Acute   Code(s): K81.0 - ACUTE CHOLECYSTITIS   

SNOMED Code(s): 11799709

## 2019-09-03 LAB
ALBUMIN SERPL-MCNC: 2.9 G/DL (ref 3.5–5)
ALP SERPL-CCNC: 89 U/L (ref 38–126)
ALT SERPL-CCNC: 21 U/L (ref 9–52)
ANION GAP SERPL CALC-SCNC: 8 MMOL/L
AST SERPL-CCNC: 24 U/L (ref 14–36)
BASOPHILS # BLD AUTO: 0 K/UL (ref 0–0.2)
BASOPHILS NFR BLD AUTO: 0 %
BUN SERPL-SCNC: 11 MG/DL (ref 7–17)
CALCIUM SPEC-MCNC: 8.4 MG/DL (ref 8.4–10.2)
CHLORIDE SERPL-SCNC: 109 MMOL/L (ref 98–107)
CO2 SERPL-SCNC: 25 MMOL/L (ref 22–30)
EOSINOPHIL # BLD AUTO: 0.4 K/UL (ref 0–0.7)
EOSINOPHIL NFR BLD AUTO: 4 %
ERYTHROCYTE [DISTWIDTH] IN BLOOD BY AUTOMATED COUNT: 3.99 M/UL (ref 3.8–5.4)
ERYTHROCYTE [DISTWIDTH] IN BLOOD: 15.4 % (ref 11.5–15.5)
GLUCOSE BLD-MCNC: 161 MG/DL (ref 75–99)
GLUCOSE BLD-MCNC: 161 MG/DL (ref 75–99)
GLUCOSE BLD-MCNC: 187 MG/DL (ref 75–99)
GLUCOSE BLD-MCNC: 256 MG/DL (ref 75–99)
GLUCOSE SERPL-MCNC: 176 MG/DL (ref 74–99)
HCT VFR BLD AUTO: 32.8 % (ref 34–46)
HGB BLD-MCNC: 10.5 GM/DL (ref 11.4–16)
LYMPHOCYTES # SPEC AUTO: 2.1 K/UL (ref 1–4.8)
LYMPHOCYTES NFR SPEC AUTO: 25 %
MAGNESIUM SPEC-SCNC: 1.9 MG/DL (ref 1.6–2.3)
MCH RBC QN AUTO: 26.4 PG (ref 25–35)
MCHC RBC AUTO-ENTMCNC: 32.1 G/DL (ref 31–37)
MCV RBC AUTO: 82.2 FL (ref 80–100)
MONOCYTES # BLD AUTO: 0.4 K/UL (ref 0–1)
MONOCYTES NFR BLD AUTO: 5 %
NEUTROPHILS # BLD AUTO: 5.5 K/UL (ref 1.3–7.7)
NEUTROPHILS NFR BLD AUTO: 65 %
PLATELET # BLD AUTO: 204 K/UL (ref 150–450)
POTASSIUM SERPL-SCNC: 3.5 MMOL/L (ref 3.5–5.1)
PROT SERPL-MCNC: 5.3 G/DL (ref 6.3–8.2)
SODIUM SERPL-SCNC: 142 MMOL/L (ref 137–145)
WBC # BLD AUTO: 8.5 K/UL (ref 3.8–10.6)

## 2019-09-03 PROCEDURE — 8E0W4CZ ROBOTIC ASSISTED PROCEDURE OF TRUNK REGION, PERCUTANEOUS ENDOSCOPIC APPROACH: ICD-10-PCS

## 2019-09-03 PROCEDURE — 0FT44ZZ RESECTION OF GALLBLADDER, PERCUTANEOUS ENDOSCOPIC APPROACH: ICD-10-PCS

## 2019-09-03 RX ADMIN — POTASSIUM CHLORIDE SCH: 14.9 INJECTION, SOLUTION INTRAVENOUS at 06:18

## 2019-09-03 RX ADMIN — Medication SCH: at 07:52

## 2019-09-03 RX ADMIN — DONEPEZIL HYDROCHLORIDE SCH: 10 TABLET ORAL at 07:52

## 2019-09-03 RX ADMIN — MAGNESIUM SULFATE IN DEXTROSE SCH MLS/HR: 10 INJECTION, SOLUTION INTRAVENOUS at 23:06

## 2019-09-03 RX ADMIN — LEUCINE, PHENYLALANINE, LYSINE, METHIONINE, ISOLEUCINE, VALINE, HISTIDINE, THREONINE, TRYPTOPHAN, ALANINE, GLYCINE, ARGININE, PROLINE, SERINE, TYROSINE, SODIUM ACETATE, DIBASIC POTASSIUM PHOSPHATE, MAGNESIUM CHLORIDE, SODIUM CHLORIDE, CALCIUM CHLORIDE, DEXTROSE SCH MLS/HR
311; 238; 247; 170; 255; 247; 204; 179; 77; 880; 438; 489; 289; 213; 17; 297; 261; 51; 77; 33; 10 INJECTION INTRAVENOUS at 12:08

## 2019-09-03 RX ADMIN — INSULIN ASPART SCH: 100 INJECTION, SOLUTION INTRAVENOUS; SUBCUTANEOUS at 00:03

## 2019-09-03 RX ADMIN — LISINOPRIL SCH: 10 TABLET ORAL at 20:10

## 2019-09-03 RX ADMIN — METRONIDAZOLE SCH MLS/HR: 500 INJECTION, SOLUTION INTRAVENOUS at 21:12

## 2019-09-03 RX ADMIN — SOYBEAN OIL SCH MLS/HR: 20 INJECTION, SOLUTION INTRAVENOUS at 17:41

## 2019-09-03 RX ADMIN — METOPROLOL TARTRATE SCH: 25 TABLET, FILM COATED ORAL at 20:10

## 2019-09-03 RX ADMIN — LEVOTHYROXINE SODIUM SCH: 25 TABLET ORAL at 06:18

## 2019-09-03 RX ADMIN — ASPIRIN 81 MG CHEWABLE TABLET SCH: 81 TABLET CHEWABLE at 07:51

## 2019-09-03 RX ADMIN — MEMANTINE HYDROCHLORIDE SCH: 10 TABLET ORAL at 20:10

## 2019-09-03 RX ADMIN — ATORVASTATIN CALCIUM SCH: 40 TABLET, FILM COATED ORAL at 20:09

## 2019-09-03 RX ADMIN — INSULIN ASPART SCH UNIT: 100 INJECTION, SOLUTION INTRAVENOUS; SUBCUTANEOUS at 18:42

## 2019-09-03 RX ADMIN — INSULIN ASPART SCH UNIT: 100 INJECTION, SOLUTION INTRAVENOUS; SUBCUTANEOUS at 06:17

## 2019-09-03 RX ADMIN — INSULIN ASPART SCH: 100 INJECTION, SOLUTION INTRAVENOUS; SUBCUTANEOUS at 17:10

## 2019-09-03 RX ADMIN — METOPROLOL TARTRATE SCH: 25 TABLET, FILM COATED ORAL at 08:50

## 2019-09-03 RX ADMIN — MEMANTINE HYDROCHLORIDE SCH: 10 TABLET ORAL at 07:52

## 2019-09-03 RX ADMIN — METRONIDAZOLE SCH MLS/HR: 500 INJECTION, SOLUTION INTRAVENOUS at 10:01

## 2019-09-03 RX ADMIN — LISINOPRIL SCH: 10 TABLET ORAL at 08:49

## 2019-09-03 RX ADMIN — HEPARIN SODIUM SCH UNIT: 5000 INJECTION, SOLUTION INTRAVENOUS; SUBCUTANEOUS at 08:48

## 2019-09-03 RX ADMIN — POTASSIUM CHLORIDE SCH MLS/HR: 7.46 INJECTION, SOLUTION INTRAVENOUS at 17:40

## 2019-09-03 RX ADMIN — HEPARIN SODIUM SCH UNIT: 5000 INJECTION, SOLUTION INTRAVENOUS; SUBCUTANEOUS at 20:12

## 2019-09-03 RX ADMIN — MAGNESIUM SULFATE IN DEXTROSE SCH MLS/HR: 10 INJECTION, SOLUTION INTRAVENOUS at 19:57

## 2019-09-03 RX ADMIN — PANTOPRAZOLE SODIUM SCH MG: 40 INJECTION, POWDER, FOR SOLUTION INTRAVENOUS at 09:10

## 2019-09-03 NOTE — P.OP
Date of Procedure: 09/03/19


Preoperative Diagnosis: 


Acute cholecystitis, ischemic cardiomyopathy, myocardial ischemia, severe 

dementia


Postoperative Diagnosis: 


Same


Procedure(s) Performed: 


Robotic cholecystectomy


Anesthesia: GETA, local


Surgeon: Colleen Moser


Estimated Blood Loss (ml): 20


Pathology: other (Gallbladder)


Condition: stable


Disposition: floor


Operative Findings: 





1.  Moderate adhesions omentum to the gallbladder with hydrops cholecystitis 

with clear bile


2.  Contaminated case secondary to the decompressed gallbladder


3.  Resection using dome down technique performed


4.  Console time 55 minutes

## 2019-09-03 NOTE — P.PN
Subjective


Progress Note Date: 09/03/19


This is an 83-year-old female admitted with non-STEMI, acute cholecystitis, 

possible acute UTI, final urine culture pending and multiple other medical 

issues.  Troponins less than 0.012, 0.073, 0.103.  Echo reported normal LV 

function, EF greater than 55% . Evaluated by cardiology, medical management 

recommended.  Patient currently on heparin drip.  CT reported ill-defined 

hypodensity within the anterior head of the pancreas measuring 3.5 x 1.2 cm, 

possible neoplasm, possible pancreatitis (though lipase only a 68).,  

Diverticulosis, nonspecific right adnexal large calcification -no specific, 

moderate size hiatal hernia, Abdominal ultrasound reporting acute cholecystitis 

with dilated common bile duct, pericholecystic fluid, biliary sludge and 

gallbladder wall thickening, visualization of pancreas suboptimal, limited 

evaluation of kidneys liver aorta with left renal cyst.  Evaluated by surgery, H

TRUPTI scan ordered.  Possible UTI with urine culture pending.  Afebrile, WBC 

trending down, 15.  Denies chest pain, palpitations or shortness of breath.  

Denies abdominal pain.  Telemetry sinus bradycardia.








08/30/2019 HIDA scan consistent with acute cholecystitis and cystic duct 

obstruction.  Abdomen MR  reporting no evidence of pancreatic mass ,gallbladder 

hydrops, gallbladder sludge, thickening empiric cholecystic fluid compatible 

with cholecystitis, changes of diverticulosis of the colon noted with no 

definite pathological adenopathy, vertebral body hemangiomas, possible hepatic 

steatosis, paraspinal soft tissue muscular atrophy incidentally noted, possible 

disc herniation at T10-T11 encroaching upon spinal cord, abnormal signal 

centrall within spinal canal at presumed level L3-L4 possible small extruded 

disc fragment or intrathecal neoplasm.  Urine culture reporting gram-negative 

bacilli .Maintained on IV Rocephin.Afebrile, vital signs stable.  Denies 

abdominal pain. Denies chest pain, palpitations or shortness of breath.








09/03/2019 Maintained on PPN secondary to questionable aspiration.  Swallow 

evaluation pending.  Continues on IV Rocephin, Flagyl.  BUN 11, creatinine 0.77 

.Scheduled for cholecystectomy today.  Denies nausea, vomiting, diarrhea.  

Denies abdominal pain.  Denies chest pain, palpitations or shortness of breath. 

Systolic blood pressure ranging in the high 90s, currently.  Denies 

lightheadedness, dizziness or focal deficits.  Afebrile, normal WBC.








Objective





- Vital Signs


Vital signs: 


                                   Vital Signs











Temp  98.4 F   09/03/19 10:46


 


Pulse  96   09/03/19 10:46


 


Resp  16   09/03/19 10:46


 


BP  184/72   09/03/19 10:46


 


Pulse Ox  96   09/03/19 10:46








                                 Intake & Output











 09/02/19 09/03/19 09/03/19





 18:59 06:59 18:59


 


Intake Total 936.667  100


 


Balance 936.667  100


 


Weight  55.6 kg 


 


Intake:   


 


  IV   100


 


  Intake, IV Titration 936.667  





  Amount   


 


    Mvi, Adult No.4 with Vit 936.667  





    K 10 ml Trace (Conc-1Ml/   





    Dose) 1 ml In Amino Acid   





    4.25%-D10w+Lytes*E* 1,000   





    ml @ 50 mls/hr IV .   





    [id number] [facility] Rx#:[id number]   


 


Other:   


 


  Voiding Method  Diaper Diaper





  Incontinent Incontinent


 


  # Voids  1 


 


  # Bowel Movements 1 1 














- Exam


PHYSICAL EXAM:


VITAL SIGNS: As above


GENERAL: Sitting up in bed, no acute distress, pleasantly confused.


HEENT:  Conjunctivae normal. eyes normal.  Oral mucosa dry


NECK:  No JVD. No thyroid enlargement. No LNs


CARDIOVASCULAR:  S1, S2 regular.systolic murmur


RESPIRATION: Breath sounds diminished in the bases. No rhonchi , crackles, or 

wheezing.


ABDOMEN:  Soft,  nontender. No guarding. no masses palpable.  Positive Bowel 

sounds.


LEGS:  No edema. no swelling.  No calf tenderness. 


PSYCHIATRY: Alert and oriented X 2, mood and affect calm, cooperative.


NERVOUS SYSTEM:  Cranial N 2-12 grossly normal. Moves all 4 limbs.  Diffuse 

weakness, No focal deficits.  Strength and sensation grossly intact.


Skin: no lesions, no rash 





                                  Microbiology





08/29/19 08:55   Urine,Voided   Urine Culture - Preliminary


                            Gram Neg Bacilli











- Labs


CBC & Chem 7: 


                                 09/03/19 06:05





                                 09/03/19 06:05


Labs: 


                  Abnormal Lab Results - Last 24 Hours (Table)











  09/02/19 09/02/19 09/03/19 Range/Units





  11:39 17:53 00:02 


 


Hgb     (11.4-16.0)  gm/dL


 


Hct     (34.0-46.0)  %


 


Chloride     ()  mmol/L


 


Glucose     (74-99)  mg/dL


 


POC Glucose (mg/dL)  152 H  169 H  161 H  (75-99)  mg/dL


 


Total Bilirubin     (0.2-1.3)  mg/dL


 


Total Protein     (6.3-8.2)  g/dL


 


Albumin     (3.5-5.0)  g/dL














  09/03/19 09/03/19 09/03/19 Range/Units





  06:03 06:05 06:05 


 


Hgb    10.5 L  (11.4-16.0)  gm/dL


 


Hct    32.8 L  (34.0-46.0)  %


 


Chloride   109 H   ()  mmol/L


 


Glucose   176 H   (74-99)  mg/dL


 


POC Glucose (mg/dL)  187 H    (75-99)  mg/dL


 


Total Bilirubin   0.1 L   (0.2-1.3)  mg/dL


 


Total Protein   5.3 L   (6.3-8.2)  g/dL


 


Albumin   2.9 L   (3.5-5.0)  g/dL








                      Microbiology - Last 24 Hours (Table)











 08/29/19 08:55 Urine Culture - Preliminary





 Urine,Voided    Gram Neg Bacilli














Assessment and Plan


Assessment: 


Active non-STEMI


-Acute cholecystitis


-Leukocytosis secondary to the above,resolved


-Acute UTI, gram-negative bacilli


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 

pancreatic mass


-CAD with history of stent 


-Hypertension


-Alzheimer's dementia


-Diabetes mellitus


-Dyslipidemia


-Atelectasis


-possible disc herniation at T10-T11 encroaching upon spinal cord, abnormal 

signal centrall within spinal canal at presumed level L3-L4 possible small ext

ruded disc fragment or intrathecal neoplasm per MR


-paraspinal soft tissue muscular atrophy incidentally noted on MR


-Diverticulosis changes, further follow-up with GI outpatient


-Possible hepatic steatosis




















Plan: Continue on current medication regime ,monitoring and symptomatic 

treatment.  Cholecystectomy scheduled for today. Maintained PPN. Swallow eval 

pending.maintain IV antibiotics.  Further recommendations to follow.




















The impression and plan of care has been dictated as directed.





:


I performed a history and examination of this patient,  discussed the same with 

the dictator.  I agree with the dictator's note ,documented as a scribe.  Any 

additional findings or plans will be noted.

## 2019-09-03 NOTE — PN
PROGRESS NOTE



DATE OF SERVICE:

09/03/2019



REASON FOR FOLLOWUP:

Acute cholecystitis.



INTERVAL HISTORY:

The patient is currently afebrile.  The patient was taken to the OR and the patient is

status post laparoscopic cholecystectomy.  The patient tolerated the procedure.

Currently sleepy and recovering from the anesthesia. Unable to provide any history.  No

nausea, vomiting or diarrhea reported by the nursing staff.



PHYSICAL EXAMINATION:

Blood pressure 123/58 with a pulse of 83, temperature 98. She is 95% on 2 L nasal

cannula.

General description is an elderly female lying in bed in no distress.

RESPIRATORY SYSTEM: Unlabored breathing. Clear to auscultation anteriorly.

HEART: S1, S2.  Regular rate and rhythm.

ABDOMEN: Soft. No tenderness.



LABS:

Hemoglobin is 10.5, white count 8.5.  BUN of 11, creatinine 0.77.  Blood culture not

obtained.  Urine with gram-negative.



DIAGNOSTIC IMPRESSION AND PLAN:

Patient with acute cholecystitis, status post laparoscopic cholecystectomy.  Patient

tolerated the procedure.  Patient at this time is on IV Rocephin and Flagyl; to

continue while monitoring clinical course closely. Continue with supportive care.





MMODL / IJN: [id number] / Job#: [id number]

## 2019-09-04 LAB
ALBUMIN SERPL-MCNC: 3.2 G/DL (ref 3.5–5)
ALP SERPL-CCNC: 122 U/L (ref 38–126)
ALT SERPL-CCNC: 57 U/L (ref 9–52)
ANION GAP SERPL CALC-SCNC: 10 MMOL/L
ANION GAP SERPL CALC-SCNC: 8 MMOL/L
AST SERPL-CCNC: 67 U/L (ref 14–36)
BUN SERPL-SCNC: 15 MG/DL (ref 7–17)
BUN SERPL-SCNC: 16 MG/DL (ref 7–17)
CALCIUM SPEC-MCNC: 8.5 MG/DL (ref 8.4–10.2)
CALCIUM SPEC-MCNC: 8.5 MG/DL (ref 8.4–10.2)
CHLORIDE SERPL-SCNC: 109 MMOL/L (ref 98–107)
CHLORIDE SERPL-SCNC: 109 MMOL/L (ref 98–107)
CO2 SERPL-SCNC: 19 MMOL/L (ref 22–30)
CO2 SERPL-SCNC: 25 MMOL/L (ref 22–30)
ERYTHROCYTE [DISTWIDTH] IN BLOOD BY AUTOMATED COUNT: 4.21 M/UL (ref 3.8–5.4)
ERYTHROCYTE [DISTWIDTH] IN BLOOD: 15.2 % (ref 11.5–15.5)
GLUCOSE BLD-MCNC: 108 MG/DL (ref 75–99)
GLUCOSE BLD-MCNC: 161 MG/DL (ref 75–99)
GLUCOSE BLD-MCNC: 222 MG/DL (ref 75–99)
GLUCOSE BLD-MCNC: 301 MG/DL (ref 75–99)
GLUCOSE SERPL-MCNC: 212 MG/DL (ref 74–99)
GLUCOSE SERPL-MCNC: 253 MG/DL (ref 74–99)
HCT VFR BLD AUTO: 34.8 % (ref 34–46)
HGB BLD-MCNC: 11.2 GM/DL (ref 11.4–16)
MAGNESIUM SPEC-SCNC: 2.7 MG/DL (ref 1.6–2.3)
MCH RBC QN AUTO: 26.7 PG (ref 25–35)
MCHC RBC AUTO-ENTMCNC: 32.2 G/DL (ref 31–37)
MCV RBC AUTO: 82.8 FL (ref 80–100)
PLATELET # BLD AUTO: 224 K/UL (ref 150–450)
POTASSIUM SERPL-SCNC: 4.2 MMOL/L (ref 3.5–5.1)
POTASSIUM SERPL-SCNC: 5.1 MMOL/L (ref 3.5–5.1)
PROT SERPL-MCNC: 5.6 G/DL (ref 6.3–8.2)
SODIUM SERPL-SCNC: 138 MMOL/L (ref 137–145)
SODIUM SERPL-SCNC: 142 MMOL/L (ref 137–145)
WBC # BLD AUTO: 16 K/UL (ref 3.8–10.6)

## 2019-09-04 RX ADMIN — POTASSIUM CHLORIDE SCH: 14.9 INJECTION, SOLUTION INTRAVENOUS at 05:53

## 2019-09-04 RX ADMIN — LISINOPRIL SCH MG: 10 TABLET ORAL at 08:31

## 2019-09-04 RX ADMIN — DONEPEZIL HYDROCHLORIDE SCH MG: 10 TABLET ORAL at 08:31

## 2019-09-04 RX ADMIN — POTASSIUM CHLORIDE SCH MLS/HR: 7.46 INJECTION, SOLUTION INTRAVENOUS at 00:23

## 2019-09-04 RX ADMIN — METRONIDAZOLE SCH MLS/HR: 500 INJECTION, SOLUTION INTRAVENOUS at 11:48

## 2019-09-04 RX ADMIN — PANTOPRAZOLE SODIUM SCH MG: 40 INJECTION, POWDER, FOR SOLUTION INTRAVENOUS at 08:28

## 2019-09-04 RX ADMIN — POTASSIUM CHLORIDE SCH MLS/HR: 7.46 INJECTION, SOLUTION INTRAVENOUS at 02:43

## 2019-09-04 RX ADMIN — METOPROLOL TARTRATE SCH MG: 25 TABLET, FILM COATED ORAL at 08:31

## 2019-09-04 RX ADMIN — ATORVASTATIN CALCIUM SCH MG: 40 TABLET, FILM COATED ORAL at 20:24

## 2019-09-04 RX ADMIN — INSULIN ASPART SCH UNIT: 100 INJECTION, SOLUTION INTRAVENOUS; SUBCUTANEOUS at 06:14

## 2019-09-04 RX ADMIN — MEMANTINE HYDROCHLORIDE SCH MG: 10 TABLET ORAL at 20:26

## 2019-09-04 RX ADMIN — INSULIN ASPART SCH UNIT: 100 INJECTION, SOLUTION INTRAVENOUS; SUBCUTANEOUS at 00:48

## 2019-09-04 RX ADMIN — LISINOPRIL SCH MG: 10 TABLET ORAL at 20:20

## 2019-09-04 RX ADMIN — HEPARIN SODIUM SCH UNIT: 5000 INJECTION, SOLUTION INTRAVENOUS; SUBCUTANEOUS at 08:30

## 2019-09-04 RX ADMIN — HEPARIN SODIUM SCH UNIT: 5000 INJECTION, SOLUTION INTRAVENOUS; SUBCUTANEOUS at 20:30

## 2019-09-04 RX ADMIN — ASPIRIN 81 MG CHEWABLE TABLET SCH MG: 81 TABLET CHEWABLE at 08:31

## 2019-09-04 RX ADMIN — Medication SCH UNIT: at 08:31

## 2019-09-04 RX ADMIN — METRONIDAZOLE SCH MLS/HR: 500 INJECTION, SOLUTION INTRAVENOUS at 20:34

## 2019-09-04 RX ADMIN — LEUCINE, PHENYLALANINE, LYSINE, METHIONINE, ISOLEUCINE, VALINE, HISTIDINE, THREONINE, TRYPTOPHAN, ALANINE, GLYCINE, ARGININE, PROLINE, SERINE, TYROSINE, SODIUM ACETATE, DIBASIC POTASSIUM PHOSPHATE, MAGNESIUM CHLORIDE, SODIUM CHLORIDE, CALCIUM CHLORIDE, DEXTROSE SCH MLS/HR
311; 238; 247; 170; 255; 247; 204; 179; 77; 880; 438; 489; 289; 213; 17; 297; 261; 51; 77; 33; 10 INJECTION INTRAVENOUS at 05:20

## 2019-09-04 RX ADMIN — METRONIDAZOLE SCH MLS/HR: 500 INJECTION, SOLUTION INTRAVENOUS at 04:26

## 2019-09-04 RX ADMIN — INSULIN ASPART SCH: 100 INJECTION, SOLUTION INTRAVENOUS; SUBCUTANEOUS at 17:57

## 2019-09-04 RX ADMIN — POTASSIUM CHLORIDE SCH MLS/HR: 7.46 INJECTION, SOLUTION INTRAVENOUS at 01:34

## 2019-09-04 RX ADMIN — METOPROLOL TARTRATE SCH MG: 25 TABLET, FILM COATED ORAL at 20:26

## 2019-09-04 RX ADMIN — LEVOTHYROXINE SODIUM SCH: 25 TABLET ORAL at 05:54

## 2019-09-04 RX ADMIN — INSULIN ASPART SCH UNIT: 100 INJECTION, SOLUTION INTRAVENOUS; SUBCUTANEOUS at 20:41

## 2019-09-04 RX ADMIN — MEMANTINE HYDROCHLORIDE SCH MG: 10 TABLET ORAL at 08:31

## 2019-09-04 NOTE — P.PN
<Ana Luisa Seo A - Last Filed: 09/04/19 13:25>





Subjective


Progress Note Date: 09/04/19





CHIEF COMPLAINT: Cholecystitis





HISTORY OF PRESENT ILLNESS: Patient is status post robotic cholecystectomy.  

Postoperative day #1.  She reports her pain is tolerable.  Denies nausea or 

vomiting.  Speech therapy completed a swallow eval, which patient passed.  She 

is tolerating diet without difficulty. WBC 16.0. AST 67. ALT 57.





PHYSICAL EXAM: 


VITAL SIGNS: Currently stable.


GENERAL: Well-developed in no acute distress. 


HEENT:  No sclera icterus. Extraocular movements grossly intact.  Moist buccal 

mucosa. 


Head is atraumatic, normocephalic. Hears conversational speech. No nasal 

drainage.


NECK:  Supple without lymphadenopathy.


CHEST:  Non-labored respirations and equal bilateral excursions. 


CARDIOVASCULAR:  Regular rate with regular rhythm.  Palpable 2+ radial pulses.


ABDOMEN:  Soft.  Nondistended. Nontender. Surgical sites clean dry intact 

without drainage.


MUSCULOSKELETAL:  No clubbing, cyanosis or edema.


NEUROLOGIC:  No focal or lateralizing signs.  Cranial nerves II through XII 

grossly intact.


PSYCH:  Appropriate affect.  Alert and oriented to person.


SKIN: Well perfused.  Good skin turgor. 





ASSESSMENT: 


1.  Acute cholecystitis, status post robotic cholecystectomy





PLAN: 


1. Pain control


2. Diet as tolerated


3. Incentive spirometry


4. Activity as tolerated


5. Stable for discharge from surgical standpoint on oral antibiotics. Follow up 

with Dr. Moser next week.





Nurse practitioner note has been reviewed by physician. Signing provider agrees 

with the documented findings, assessment, and plan of care. 








Objective





- Vital Signs


Vital signs: 


                                   Vital Signs











Temp  98.4 F   09/04/19 08:25


 


Pulse  63   09/04/19 08:25


 


Resp  16   09/04/19 08:25


 


BP  160/77   09/04/19 08:25


 


Pulse Ox  99   09/04/19 08:25








                                 Intake & Output











 09/03/19 09/04/19 09/04/19





 18:59 06:59 18:59


 


Intake Total 2009.167 1060 100


 


Output Total 20  


 


Balance 1666.034 4320 100


 


Weight  56 kg 


 


Intake:   


 


  IV 1000  


 


  Intake, IV Titration 5219.985 5044 100





  Amount   


 


    Magnesium Sulfate-D5w Pmx  100 





    1 gm In Dextrose/Water 1   





    100ml.bag @ 100 mls/hr   





    IVPB Q1H [facility] Rx#:   





    [id number]   


 


    Mvi, Adult No.4 with Vit 1009.167 860 





    K 10 ml Trace (Conc-1Ml/   





    Dose) 1 ml In Amino Acid   





    4.25%-D10w+Lytes*E* 1,000   





    ml @ 50 mls/hr IV .   





    [id number] [facility] Rx#:[id number]   


 


    metroNIDAZOLE-NS   100 100





    mg In Saline 1 100ml.bag   





    @ 100 mls/hr IVPB Q8H [facility]   





    Rx#:[id number]   


 


Output:   


 


  Estimated Blood Loss 20  


 


Other:   


 


  Voiding Method Diaper  Diaper





 Incontinent  Incontinent


 


  # Voids  2 2














- Labs


CBC & Chem 7: 


                                 09/04/19 10:57





                                 09/04/19 10:57


Labs: 


                  Abnormal Lab Results - Last 24 Hours (Table)











  09/03/19 09/03/19 09/03/19 Range/Units





  18:17 18:19 18:21 


 


WBC     (3.8-10.6)  k/uL


 


Hgb     (11.4-16.0)  gm/dL


 


Chloride     ()  mmol/L


 


Carbon Dioxide     (22-30)  mmol/L


 


Glucose     (74-99)  mg/dL


 


POC Glucose (mg/dL)  572 H  267 H  256 H  (75-99)  mg/dL


 


Magnesium     (1.6-2.3)  mg/dL


 


AST     (14-36)  U/L


 


ALT     (9-52)  U/L


 


Total Protein     (6.3-8.2)  g/dL


 


Albumin     (3.5-5.0)  g/dL














  09/04/19 09/04/19 09/04/19 Range/Units





  00:34 04:21 05:57 


 


WBC     (3.8-10.6)  k/uL


 


Hgb     (11.4-16.0)  gm/dL


 


Chloride   109 H   ()  mmol/L


 


Carbon Dioxide   19 L   (22-30)  mmol/L


 


Glucose   253 H   (74-99)  mg/dL


 


POC Glucose (mg/dL)  301 H   222 H  (75-99)  mg/dL


 


Magnesium   2.7 H   (1.6-2.3)  mg/dL


 


AST     (14-36)  U/L


 


ALT     (9-52)  U/L


 


Total Protein     (6.3-8.2)  g/dL


 


Albumin     (3.5-5.0)  g/dL














  09/04/19 09/04/19 Range/Units





  10:57 10:57 


 


WBC  16.0 H   (3.8-10.6)  k/uL


 


Hgb  11.2 L   (11.4-16.0)  gm/dL


 


Chloride   109 H  ()  mmol/L


 


Carbon Dioxide    (22-30)  mmol/L


 


Glucose   212 H  (74-99)  mg/dL


 


POC Glucose (mg/dL)    (75-99)  mg/dL


 


Magnesium    (1.6-2.3)  mg/dL


 


AST   67 H  (14-36)  U/L


 


ALT   57 H  (9-52)  U/L


 


Total Protein   5.6 L  (6.3-8.2)  g/dL


 


Albumin   3.2 L  (3.5-5.0)  g/dL














Assessment and Plan


(1) Acute cholecystitis


Current Visit: Yes   Status: Acute   Code(s): K81.0 - ACUTE CHOLECYSTITIS   

SNOMED Code(s): 15486576


   





<Colleen Moser - Last Filed: 09/04/19 21:17>





Subjective





Recommend antibiotics for discharge.  Diet as tolerated.  Follow-up in one week.





Objective





- Vital Signs


Vital signs: 


                                   Vital Signs











Temp  97.5 F L  09/04/19 19:49


 


Pulse  76   09/04/19 19:49


 


Resp  18   09/04/19 19:49


 


BP  174/71   09/04/19 19:49


 


Pulse Ox  94 L  09/04/19 19:49








                                 Intake & Output











 09/04/19 09/04/19 09/05/19





 06:59 18:59 06:59


 


Intake Total 1060 100 


 


Balance 1060 100 


 


Weight 56 kg 56 kg 


 


Intake:   


 


  Intake, IV Titration 1060 100 





  Amount   


 


    Magnesium Sulfate-D5w Pmx 100  





    1 gm In Dextrose/Water 1   





    100ml.bag @ 100 mls/hr   





    IVPB Q1H [facility] Rx#:   





    [id number]   


 


    Mvi, Adult No.4 with Vit 860  





    K 10 ml Trace (Conc-1Ml/   





    Dose) 1 ml In Amino Acid   





    4.25%-D10w+Lytes*E* 1,000   





    ml @ 50 mls/hr IV .   





    [id number] [facility] Rx#:[id number]   


 


    metroNIDAZOLE-NS  100 100 





    mg In Saline 1 100ml.bag   





    @ 100 mls/hr IVPB Q8H [facility]   





    Rx#:[id number]   


 


Other:   


 


  Voiding Method  Diaper 





  Incontinent 


 


  # Voids 2 2 1














- Labs


CBC & Chem 7: 


                                 09/04/19 10:57





                                 09/04/19 10:57


Labs: 


                  Abnormal Lab Results - Last 24 Hours (Table)











  09/04/19 09/04/19 09/04/19 Range/Units





  00:34 04:21 05:57 


 


WBC     (3.8-10.6)  k/uL


 


Hgb     (11.4-16.0)  gm/dL


 


Chloride   109 H   ()  mmol/L


 


Carbon Dioxide   19 L   (22-30)  mmol/L


 


Glucose   253 H   (74-99)  mg/dL


 


POC Glucose (mg/dL)  301 H   222 H  (75-99)  mg/dL


 


Magnesium   2.7 H   (1.6-2.3)  mg/dL


 


AST     (14-36)  U/L


 


ALT     (9-52)  U/L


 


Total Protein     (6.3-8.2)  g/dL


 


Albumin     (3.5-5.0)  g/dL














  09/04/19 09/04/19 09/04/19 Range/Units





  10:57 10:57 17:57 


 


WBC  16.0 H    (3.8-10.6)  k/uL


 


Hgb  11.2 L    (11.4-16.0)  gm/dL


 


Chloride   109 H   ()  mmol/L


 


Carbon Dioxide     (22-30)  mmol/L


 


Glucose   212 H   (74-99)  mg/dL


 


POC Glucose (mg/dL)    108 H  (75-99)  mg/dL


 


Magnesium     (1.6-2.3)  mg/dL


 


AST   67 H   (14-36)  U/L


 


ALT   57 H   (9-52)  U/L


 


Total Protein   5.6 L   (6.3-8.2)  g/dL


 


Albumin   3.2 L   (3.5-5.0)  g/dL














  09/04/19 Range/Units





  20:16 


 


WBC   (3.8-10.6)  k/uL


 


Hgb   (11.4-16.0)  gm/dL


 


Chloride   ()  mmol/L


 


Carbon Dioxide   (22-30)  mmol/L


 


Glucose   (74-99)  mg/dL


 


POC Glucose (mg/dL)  161 H  (75-99)  mg/dL


 


Magnesium   (1.6-2.3)  mg/dL


 


AST   (14-36)  U/L


 


ALT   (9-52)  U/L


 


Total Protein   (6.3-8.2)  g/dL


 


Albumin   (3.5-5.0)  g/dL

## 2019-09-04 NOTE — P.PN
Subjective


Progress Note Date: 09/04/19


This is an 83-year-old female admitted with non-STEMI, acute cholecystitis, 

possible acute UTI, final urine culture pending and multiple other medical 

issues.  Troponins less than 0.012, 0.073, 0.103.  Echo reported normal LV 

function, EF greater than 55% . Evaluated by cardiology, medical management 

recommended.  Patient currently on heparin drip.  CT reported ill-defined 

hypodensity within the anterior head of the pancreas measuring 3.5 x 1.2 cm, 

possible neoplasm, possible pancreatitis (though lipase only a 68).,  

Diverticulosis, nonspecific right adnexal large calcification -no specific, 

moderate size hiatal hernia, Abdominal ultrasound reporting acute cholecystitis 

with dilated common bile duct, pericholecystic fluid, biliary sludge and 

gallbladder wall thickening, visualization of pancreas suboptimal, limited 

evaluation of kidneys liver aorta with left renal cyst.  Evaluated by surgery, H

TRUPTI scan ordered.  Possible UTI with urine culture pending.  Afebrile, WBC 

trending down, 15.  Denies chest pain, palpitations or shortness of breath.  

Denies abdominal pain.  Telemetry sinus bradycardia.








08/30/2019 HIDA scan consistent with acute cholecystitis and cystic duct 

obstruction.  Abdomen MR  reporting no evidence of pancreatic mass ,gallbladder 

hydrops, gallbladder sludge, thickening empiric cholecystic fluid compatible 

with cholecystitis, changes of diverticulosis of the colon noted with no 

definite pathological adenopathy, vertebral body hemangiomas, possible hepatic 

steatosis, paraspinal soft tissue muscular atrophy incidentally noted, possible 

disc herniation at T10-T11 encroaching upon spinal cord, abnormal signal 

centrall within spinal canal at presumed level L3-L4 possible small extruded 

disc fragment or intrathecal neoplasm.  Urine culture reporting gram-negative 

bacilli .Maintained on IV Rocephin.Afebrile, vital signs stable.  Denies 

abdominal pain. Denies chest pain, palpitations or shortness of breath.








09/03/2019 Maintained on PPN secondary to questionable aspiration.  Swallow 

evaluation pending.  Continues on IV Rocephin, Flagyl.  BUN 11, creatinine 0.77 

.Scheduled for cholecystectomy today.  Denies nausea, vomiting, diarrhea.  

Denies abdominal pain.  Denies chest pain, palpitations or shortness of breath. 

Systolic blood pressure ranging in the high 90s, currently.  Denies 

lightheadedness, dizziness or focal deficits.  Afebrile, normal WBC.








09/04/2019  status post robotic cholecystectomy with perforated gallbladder.  

Tolerated procedure well.  Afebrile, WBC elevated at 16.  Mildly elevated AST / 

ALT. Pain controlled.  PPN weaned off, passed swallow evaluation as per speech 

therapy.  Good diet intake with no nausea vomiting or diarrhea.  Elevated blood 

sugars into the low 200s.  Passing flatus, no bowel movement. 








Objective





- Vital Signs


Vital signs: 


                                   Vital Signs











Temp  98.4 F   09/04/19 08:25


 


Pulse  63   09/04/19 08:25


 


Resp  16   09/04/19 08:25


 


BP  160/77   09/04/19 08:25


 


Pulse Ox  99   09/04/19 08:25








                                 Intake & Output











 09/03/19 09/04/19 09/04/19





 18:59 06:59 18:59


 


Intake Total 2009.167 1060 100


 


Output Total 20  


 


Balance 8957.691 8705 100


 


Weight  56 kg 


 


Intake:   


 


  IV 1000  


 


  Intake, IV Titration 3642.293 9100 100





  Amount   


 


    Magnesium Sulfate-D5w Pmx  100 





    1 gm In Dextrose/Water 1   





    100ml.bag @ 100 mls/hr   





    IVPB Q1H [facility] Rx#:   





    [id number]   


 


    Mvi, Adult No.4 with Vit 1009.167 860 





    K 10 ml Trace (Conc-1Ml/   





    Dose) 1 ml In Amino Acid   





    4.25%-D10w+Lytes*E* 1,000   





    ml @ 50 mls/hr IV .   





    [id number] [facility] Rx#:[id number]   


 


    metroNIDAZOLE-NS   100 100





    mg In Saline 1 100ml.bag   





    @ 100 mls/hr IVPB Q8H [facility]   





    Rx#:[id number]   


 


Output:   


 


  Estimated Blood Loss 20  


 


Other:   


 


  Voiding Method Diaper  





 Incontinent  


 


  # Voids  2 














- Exam


PHYSICAL EXAM:


VITAL SIGNS: As above


GENERAL: Sitting up in bed, no acute distress, pleasantly confused.


HEENT:  Conjunctivae normal. eyes normal.  Oral mucosa moist


NECK:  No JVD. No thyroid enlargement. No LNs


CARDIOVASCULAR:  S1, S2 regular.systolic murmur


RESPIRATION: Breath sounds diminished in the bases. No rhonchi , crackles, or 

wheezing.


ABDOMEN:  Soft,  status post surgery.  No guarding. no masses palpable.  

Positive Bowel sounds.


LEGS:  No edema. no swelling.  No calf tenderness. 


PSYCHIATRY: Alert and oriented X 2, mood and affect calm, cooperative.


NERVOUS SYSTEM:  Cranial N 2-12 grossly normal. Moves all 4 limbs.  Diffuse 

weakness, No focal deficits.  Strength and sensation grossly intact.


Skin: no lesions, no rash 





                                  Microbiology





08/29/19 08:55   Urine,Voided   Urine Culture - Preliminary


                            Gram Neg Bacilli











- Labs


CBC & Chem 7: 


                                 09/04/19 10:57





                                 09/04/19 10:57


Labs: 


                  Abnormal Lab Results - Last 24 Hours (Table)











  09/03/19 09/03/19 09/03/19 Range/Units





  10:59 18:17 18:19 


 


Chloride     ()  mmol/L


 


Carbon Dioxide     (22-30)  mmol/L


 


Glucose     (74-99)  mg/dL


 


POC Glucose (mg/dL)  161 H  572 H  267 H  (75-99)  mg/dL


 


Magnesium     (1.6-2.3)  mg/dL














  09/03/19 09/04/19 09/04/19 Range/Units





  18:21 00:34 04:21 


 


Chloride    109 H  ()  mmol/L


 


Carbon Dioxide    19 L  (22-30)  mmol/L


 


Glucose    253 H  (74-99)  mg/dL


 


POC Glucose (mg/dL)  256 H  301 H   (75-99)  mg/dL


 


Magnesium    2.7 H  (1.6-2.3)  mg/dL














  09/04/19 Range/Units





  05:57 


 


Chloride   ()  mmol/L


 


Carbon Dioxide   (22-30)  mmol/L


 


Glucose   (74-99)  mg/dL


 


POC Glucose (mg/dL)  222 H  (75-99)  mg/dL


 


Magnesium   (1.6-2.3)  mg/dL








                      Microbiology - Last 24 Hours (Table)











 08/29/19 08:55 Urine Culture - Preliminary





 Urine,Voided    Gram Neg Bacilli














Assessment and Plan


Assessment: 


Active non-STEMI


-Acute cholecystitis, status post robot-assisted cholecystectomy for perforated 

gallbladder.


-Leukocytosis secondary to the above


-Acute UTI, gram-negative bacilli


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 

pancreatic mass


-CAD with history of stent 


-Hypertension


-Alzheimer's dementia


-Diabetes mellitus


-Dyslipidemia


-Atelectasis


-possible disc herniation at T10-T11 encroaching upon spinal cord, abnormal 

signal centrall within spinal canal at presumed level L3-L4 possible small 

extruded disc fragment or intrathecal neoplasm per MR


-paraspinal soft tissue muscular atrophy incidentally noted on MR


-Diverticulosis changes, further follow-up with GI outpatient


-Possible hepatic steatosis




















Plan: Continue on current medication regime ,monitoring and symptomatic 

treatment.Maintain IV antibiotics.  Incentive spirometer every hour 10 while 

awake.  Increase ambulation as tolerated.  DC antibiotics as per ID. Discharge 

planning in progress for tomorrow.




















The impression and plan of care has been dictated as directed.





:


I performed a history and examination of this patient,  discussed the same with 

the dictator.  I agree with the dictator's note ,documented as a scribe.  Any 

additional findings or plans will be noted.

## 2019-09-04 NOTE — PN
PROGRESS NOTE



DATE OF SERVICE:

09/04/2019



REASON FOR FOLLOWUP:

Acute cholecystitis with fever.



INTERVAL HISTORY:

The patient is currently afebrile.  The patient is status post robotic-assisted

laparoscopic cholecystectomy.  The patient tolerated the procedure well.  The patient

is currently tolerating her diet.  No nausea, no vomiting.  No abdominal pain or any

diarrhea.



PHYSICAL EXAMINATION:

Blood pressure 160/77 with a pulse of 63, temperature 98.4. She is 99% on 2 L nasal

cannula.

General description is an elderly female lying in bed in no distress.

RESPIRATORY SYSTEM: Unlabored breathing. Clear to auscultation anteriorly.

HEART: S1, S2.  Regular rate and rhythm.

ABDOMEN: Soft. No significant tenderness, guarding or rigidity.

EXTREMITIES: No edema of the feet.



LABS:

Hemoglobin 11.2, white count 16, with a BUN of 16, creatinine 0.82.



DIAGNOSTIC IMPRESSION AND PLAN:

Patient with acute cholecystitis, for which the patient is status post laparoscopic

cholecystectomy. White count has slightly jumped higher today; hence will monitor the

patient closely on IV antibiotic for another 24 hours.  Repeat CBC tomorrow.  

at the bedside.  Questions were answered.





MMODL / IJN: [id number] / Job#: [id number]

## 2019-09-05 LAB
ALBUMIN SERPL-MCNC: 2.8 G/DL (ref 3.5–5)
ALP SERPL-CCNC: 113 U/L (ref 38–126)
ALT SERPL-CCNC: 67 U/L (ref 9–52)
ANION GAP SERPL CALC-SCNC: 8 MMOL/L
AST SERPL-CCNC: 78 U/L (ref 14–36)
BASOPHILS # BLD AUTO: 0 K/UL (ref 0–0.2)
BASOPHILS NFR BLD AUTO: 0 %
BUN SERPL-SCNC: 17 MG/DL (ref 7–17)
CALCIUM SPEC-MCNC: 8.2 MG/DL (ref 8.4–10.2)
CHLORIDE SERPL-SCNC: 108 MMOL/L (ref 98–107)
CO2 SERPL-SCNC: 23 MMOL/L (ref 22–30)
EOSINOPHIL # BLD AUTO: 0.4 K/UL (ref 0–0.7)
EOSINOPHIL NFR BLD AUTO: 3 %
ERYTHROCYTE [DISTWIDTH] IN BLOOD BY AUTOMATED COUNT: 3.77 M/UL (ref 3.8–5.4)
ERYTHROCYTE [DISTWIDTH] IN BLOOD: 15.8 % (ref 11.5–15.5)
GLUCOSE BLD-MCNC: 110 MG/DL (ref 75–99)
GLUCOSE BLD-MCNC: 133 MG/DL (ref 75–99)
GLUCOSE BLD-MCNC: 133 MG/DL (ref 75–99)
GLUCOSE BLD-MCNC: 156 MG/DL (ref 75–99)
GLUCOSE SERPL-MCNC: 120 MG/DL (ref 74–99)
HBA1C MFR BLD: 6.3 % (ref 4–6)
HCT VFR BLD AUTO: 31.1 % (ref 34–46)
HGB BLD-MCNC: 10.1 GM/DL (ref 11.4–16)
LYMPHOCYTES # SPEC AUTO: 2.2 K/UL (ref 1–4.8)
LYMPHOCYTES NFR SPEC AUTO: 17 %
MAGNESIUM SPEC-SCNC: 1.9 MG/DL (ref 1.6–2.3)
MCH RBC QN AUTO: 26.9 PG (ref 25–35)
MCHC RBC AUTO-ENTMCNC: 32.6 G/DL (ref 31–37)
MCV RBC AUTO: 82.5 FL (ref 80–100)
MONOCYTES # BLD AUTO: 0.8 K/UL (ref 0–1)
MONOCYTES NFR BLD AUTO: 6 %
NEUTROPHILS # BLD AUTO: 9.7 K/UL (ref 1.3–7.7)
NEUTROPHILS NFR BLD AUTO: 73 %
PLATELET # BLD AUTO: 215 K/UL (ref 150–450)
POTASSIUM SERPL-SCNC: 4.3 MMOL/L (ref 3.5–5.1)
PROT SERPL-MCNC: 5.1 G/DL (ref 6.3–8.2)
SODIUM SERPL-SCNC: 139 MMOL/L (ref 137–145)
WBC # BLD AUTO: 13.3 K/UL (ref 3.8–10.6)

## 2019-09-05 RX ADMIN — HEPARIN SODIUM SCH UNIT: 5000 INJECTION, SOLUTION INTRAVENOUS; SUBCUTANEOUS at 21:35

## 2019-09-05 RX ADMIN — INSULIN ASPART SCH UNIT: 100 INJECTION, SOLUTION INTRAVENOUS; SUBCUTANEOUS at 17:53

## 2019-09-05 RX ADMIN — LISINOPRIL SCH MG: 10 TABLET ORAL at 08:57

## 2019-09-05 RX ADMIN — METRONIDAZOLE SCH MLS/HR: 500 INJECTION, SOLUTION INTRAVENOUS at 21:35

## 2019-09-05 RX ADMIN — DONEPEZIL HYDROCHLORIDE SCH MG: 10 TABLET ORAL at 08:56

## 2019-09-05 RX ADMIN — POTASSIUM CHLORIDE SCH: 14.9 INJECTION, SOLUTION INTRAVENOUS at 05:28

## 2019-09-05 RX ADMIN — HEPARIN SODIUM SCH UNIT: 5000 INJECTION, SOLUTION INTRAVENOUS; SUBCUTANEOUS at 08:57

## 2019-09-05 RX ADMIN — INSULIN ASPART SCH: 100 INJECTION, SOLUTION INTRAVENOUS; SUBCUTANEOUS at 21:28

## 2019-09-05 RX ADMIN — MEMANTINE HYDROCHLORIDE SCH MG: 10 TABLET ORAL at 08:56

## 2019-09-05 RX ADMIN — METRONIDAZOLE SCH MLS/HR: 500 INJECTION, SOLUTION INTRAVENOUS at 12:19

## 2019-09-05 RX ADMIN — METOPROLOL TARTRATE SCH MG: 25 TABLET, FILM COATED ORAL at 08:57

## 2019-09-05 RX ADMIN — LISINOPRIL SCH MG: 10 TABLET ORAL at 21:35

## 2019-09-05 RX ADMIN — PANTOPRAZOLE SODIUM SCH MG: 40 INJECTION, POWDER, FOR SOLUTION INTRAVENOUS at 08:56

## 2019-09-05 RX ADMIN — METRONIDAZOLE SCH MLS/HR: 500 INJECTION, SOLUTION INTRAVENOUS at 04:56

## 2019-09-05 RX ADMIN — Medication SCH UNIT: at 08:57

## 2019-09-05 RX ADMIN — INSULIN ASPART SCH: 100 INJECTION, SOLUTION INTRAVENOUS; SUBCUTANEOUS at 08:54

## 2019-09-05 RX ADMIN — LEVOTHYROXINE SODIUM SCH MCG: 25 TABLET ORAL at 05:29

## 2019-09-05 RX ADMIN — ASPIRIN 81 MG CHEWABLE TABLET SCH MG: 81 TABLET CHEWABLE at 08:57

## 2019-09-05 RX ADMIN — METOPROLOL TARTRATE SCH MG: 25 TABLET, FILM COATED ORAL at 21:35

## 2019-09-05 RX ADMIN — ATORVASTATIN CALCIUM SCH MG: 40 TABLET, FILM COATED ORAL at 21:35

## 2019-09-05 RX ADMIN — MEMANTINE HYDROCHLORIDE SCH MG: 10 TABLET ORAL at 21:35

## 2019-09-05 RX ADMIN — INSULIN ASPART SCH: 100 INJECTION, SOLUTION INTRAVENOUS; SUBCUTANEOUS at 12:20

## 2019-09-05 NOTE — P.PN
<Ana Luisa Seo A - Last Filed: 09/05/19 14:10>





Subjective


Progress Note Date: 09/05/19





CHIEF COMPLAINT: Cholecystitis





HISTORY OF PRESENT ILLNESS: Patient is status post robotic cholecystectomy.  

Postoperative day #2.  Spouse at bedside. She reports her pain is tolerable. 

Tolerating diet. Denies nausea or vomiting.  said patient had a few bites

of everything on her breakfast tray. WBC 13.3. 





PHYSICAL EXAM: 


VITAL SIGNS: Reviewed


GENERAL: Well-developed in no acute distress. 


HEENT:  No sclera icterus. Extraocular movements grossly intact.  Moist buccal 

mucosa. 


Head is atraumatic, normocephalic. Hears conversational speech. No nasal 

drainage.


NECK:  Supple without lymphadenopathy.


CHEST:  Non-labored respirations and equal bilateral excursions. 


CARDIOVASCULAR:  Regular rate with regular rhythm.  Palpable 2+ radial pulses.


ABDOMEN:  Soft.  Nondistended. Nontender. Surgical sites clean dry intact 

without drainage.


MUSCULOSKELETAL:  No clubbing, cyanosis or edema.


NEUROLOGIC:  No focal or lateralizing signs.  Cranial nerves II through XII 

grossly intact.


PSYCH:  Appropriate affect.  Alert and oriented x 1-2.


SKIN: Well perfused.  Good skin turgor. 





ASSESSMENT: 


1.  Acute cholecystitis, status post robotic cholecystectomy





PLAN: 


1. Pain control


2. Diet as tolerated


3. Incentive spirometry


4. Activity as tolerated


5. Stable for discharge from surgical standpoint on oral antibiotics. Dr. Saldana 

recommending Ceftin and Flagyl at discharge. Patient now being discharged to 

[facility] for subacute Rehab. Patient to follow up with Dr. Moser on 

9/24/2019.





Nurse practitioner note has been reviewed by physician. Signing provider agrees 

with the documented findings, assessment, and plan of care. 








Objective





- Vital Signs


Vital signs: 


                                   Vital Signs











Temp  99.7 F H  09/05/19 06:49


 


Pulse  70   09/05/19 06:49


 


Resp  18   09/05/19 06:49


 


BP  115/61   09/05/19 06:49


 


Pulse Ox  94 L  09/05/19 06:49








                                 Intake & Output











 09/04/19 09/05/19 09/05/19





 18:59 06:59 18:59


 


Intake Total 100  480


 


Balance 100  480


 


Weight 56 kg  


 


Intake:   


 


  Intake, IV Titration 100  





  Amount   


 


    metroNIDAZOLE-NS  100  





    mg In Saline 1 100ml.bag   





    @ 100 mls/hr IVPB [facility]H [facility]   





    Rx#:[id number]   


 


  Oral   480


 


Other:   


 


  Voiding Method Diaper Diaper Diaper





 Incontinent  


 


  # Voids 2 1 














- Labs


CBC & Chem 7: 


                                 09/05/19 06:41





                                 09/05/19 06:41


Labs: 


                  Abnormal Lab Results - Last 24 Hours (Table)











  09/04/19 09/04/19 09/05/19 Range/Units





  17:57 20:16 06:41 


 


WBC     (3.8-10.6)  k/uL


 


RBC     (3.80-5.40)  m/uL


 


Hgb     (11.4-16.0)  gm/dL


 


Hct     (34.0-46.0)  %


 


RDW     (11.5-15.5)  %


 


Neutrophils #     (1.3-7.7)  k/uL


 


Chloride    108 H  ()  mmol/L


 


Glucose    120 H  (74-99)  mg/dL


 


POC Glucose (mg/dL)  108 H  161 H   (75-99)  mg/dL


 


Calcium    8.2 L  (8.4-10.2)  mg/dL


 


Phosphorus    4.6 H  (2.5-4.5)  mg/dL


 


AST    78 H  (14-36)  U/L


 


ALT    67 H  (9-52)  U/L


 


Total Protein    5.1 L  (6.3-8.2)  g/dL


 


Albumin    2.8 L  (3.5-5.0)  g/dL














  09/05/19 09/05/19 09/05/19 Range/Units





  06:41 06:49 11:02 


 


WBC  13.3 H    (3.8-10.6)  k/uL


 


RBC  3.77 L    (3.80-5.40)  m/uL


 


Hgb  10.1 L    (11.4-16.0)  gm/dL


 


Hct  31.1 L    (34.0-46.0)  %


 


RDW  15.8 H    (11.5-15.5)  %


 


Neutrophils #  9.7 H    (1.3-7.7)  k/uL


 


Chloride     ()  mmol/L


 


Glucose     (74-99)  mg/dL


 


POC Glucose (mg/dL)   133 H  133 H  (75-99)  mg/dL


 


Calcium     (8.4-10.2)  mg/dL


 


Phosphorus     (2.5-4.5)  mg/dL


 


AST     (14-36)  U/L


 


ALT     (9-52)  U/L


 


Total Protein     (6.3-8.2)  g/dL


 


Albumin     (3.5-5.0)  g/dL








                      Microbiology - Last 24 Hours (Table)











 08/29/19 08:55 Urine Culture - Final





 Urine,Voided    Escherichia coli














Assessment and Plan


(1) Acute cholecystitis


Current Visit: Yes   Status: Acute   Code(s): K81.0 - ACUTE CHOLECYSTITIS   

SNOMED Code(s): 08191922


   





<Colleen Moser N - Last Filed: 09/05/19 15:44>





Subjective





All questions addressed with the patient including her  at bedside.  

Follow-up after discharge from [facility] in 2-3 weeks.





Objective





- Vital Signs


Vital signs: 


                                   Vital Signs











Temp  99.4 F   09/05/19 15:06


 


Pulse  65   09/05/19 15:06


 


Resp  18   09/05/19 15:06


 


BP  112/65   09/05/19 15:06


 


Pulse Ox  97   09/05/19 15:06








                                 Intake & Output











 09/04/19 09/05/19 09/05/19





 18:59 06:59 18:59


 


Intake Total 100  480


 


Balance 100  480


 


Weight 56 kg  


 


Intake:   


 


  Intake, IV Titration 100  





  Amount   


 


    metroNIDAZOLE-NS  100  





    mg In Saline 1 100ml.bag   





    @ 100 mls/hr IVPB [facility]H [facility]   





    Rx#:[id number]   


 


  Oral   480


 


Other:   


 


  Voiding Method Diaper Diaper Diaper





 Incontinent  


 


  # Voids 2 1 2














- Labs


CBC & Chem 7: 


                                 09/05/19 06:41





                                 09/05/19 06:41


Labs: 


                  Abnormal Lab Results - Last 24 Hours (Table)











  09/04/19 09/04/19 09/05/19 Range/Units





  17:57 20:16 06:41 


 


WBC     (3.8-10.6)  k/uL


 


RBC     (3.80-5.40)  m/uL


 


Hgb     (11.4-16.0)  gm/dL


 


Hct     (34.0-46.0)  %


 


RDW     (11.5-15.5)  %


 


Neutrophils #     (1.3-7.7)  k/uL


 


Chloride    108 H  ()  mmol/L


 


Glucose    120 H  (74-99)  mg/dL


 


POC Glucose (mg/dL)  108 H  161 H   (75-99)  mg/dL


 


Calcium    8.2 L  (8.4-10.2)  mg/dL


 


Phosphorus    4.6 H  (2.5-4.5)  mg/dL


 


AST    78 H  (14-36)  U/L


 


ALT    67 H  (9-52)  U/L


 


Total Protein    5.1 L  (6.3-8.2)  g/dL


 


Albumin    2.8 L  (3.5-5.0)  g/dL














  09/05/19 09/05/19 09/05/19 Range/Units





  06:41 06:49 11:02 


 


WBC  13.3 H    (3.8-10.6)  k/uL


 


RBC  3.77 L    (3.80-5.40)  m/uL


 


Hgb  10.1 L    (11.4-16.0)  gm/dL


 


Hct  31.1 L    (34.0-46.0)  %


 


RDW  15.8 H    (11.5-15.5)  %


 


Neutrophils #  9.7 H    (1.3-7.7)  k/uL


 


Chloride     ()  mmol/L


 


Glucose     (74-99)  mg/dL


 


POC Glucose (mg/dL)   133 H  133 H  (75-99)  mg/dL


 


Calcium     (8.4-10.2)  mg/dL


 


Phosphorus     (2.5-4.5)  mg/dL


 


AST     (14-36)  U/L


 


ALT     (9-52)  U/L


 


Total Protein     (6.3-8.2)  g/dL


 


Albumin     (3.5-5.0)  g/dL








                      Microbiology - Last 24 Hours (Table)











 08/29/19 08:55 Urine Culture - Final





 Urine,Voided    Escherichia coli

## 2019-09-05 NOTE — P.DS
Providers


Date of admission: 


08/29/19 12:51





Expected date of discharge: 09/05/19


Attending physician: 


Geremias Quiñones





Consults: 





                                        





08/28/19 18:44


Consult Physician Routine 


   Consulting Provider: Cristiano Gibson


   Consult Reason/Comments: cp


   Do you want consulting provider notified?: Yes





08/29/19 17:31


Consult Physician Routine 


   Consulting Provider: Colleen Moser


   Consult Reason/Comments: cholecystitis


   Do you want consulting provider notified?: Already Contacted





08/30/19 10:21


Consult Physician Stat 


   Consulting Provider: Manny Saldana


   Consult Reason/Comments: Antibiotic management


   Do you want consulting provider notified?: Yes











Primary care physician: 


[facility] Course: 


Final Diagnoses:


 -Non-STEMI


-Acute cholecystitis, status post robot-assisted cholecystectomy for perforated 

gallbladder.


-Leukocytosis secondary to the above, improving


-Acute UTI, E. coli


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 

pancreatic mass


-CAD with history of stent 


-Hypertension


-Alzheimer's dementia


-Diabetes mellitus


-Dyslipidemia


-Atelectasis


-possible disc herniation at T10-T11 encroaching upon spinal cord, abnormal 

signal centrall within spinal canal at presumed level L3-L4 possible small 

extruded disc fragment or intrathecal neoplasm per MR


-paraspinal soft tissue muscular atrophy incidentally noted on MR


-Diverticulosis changes, further follow-up with GI outpatient


-Possible hepatic steatosis





Hospital course:This is an 83-year-old female admitted with non-STEMI, acute 

cholecystitis, possible acute UTI, final urine culture pending and multiple 

other medical issues.  Troponins less than 0.012, 0.073, 0.103.  Echo reported 

normal LV function, EF greater than 55% . Evaluated by cardiology, medical 

management recommended.  Patient currently on heparin drip.  CT reported ill-

defined hypodensity within the anterior head of the pancreas measuring 3.5 x 1.2

cm, possible neoplasm, possible pancreatitis (though lipase only a 68).,  

Diverticulosis, nonspecific right adnexal large calcification -no specific, 

moderate size hiatal hernia, Abdominal ultrasound reporting acute cholecystitis 

with dilated common bile duct, pericholecystic fluid, biliary sludge and 

gallbladder wall thickening, visualization of pancreas suboptimal, limited 

evaluation of kidneys liver aorta with left renal cyst.  Evaluated by surgery, 

HIDA scan ordered.  Possible UTI with urine culture pending.  Afebrile, WBC 

trending down, 15.  Denies chest pain, palpitations or shortness of breath.  

Denies abdominal pain.  Telemetry sinus bradycardia.








08/30/2019 HIDA scan consistent with acute cholecystitis and cystic duct 

obstruction.  Abdomen MR  reporting no evidence of pancreatic mass ,gallbladder 

hydrops, gallbladder sludge, thickening empiric cholecystic fluid compatible 

with cholecystitis, changes of diverticulosis of the colon noted with no 

definite pathological adenopathy, vertebral body hemangiomas, possible hepatic 

steatosis, paraspinal soft tissue muscular atrophy incidentally noted, possible 

disc herniation at T10-T11 encroaching upon spinal cord, abnormal signal 

centrall within spinal canal at presumed level L3-L4 possible small extruded 

disc fragment or intrathecal neoplasm.  Urine culture reporting gram-negative 

bacilli .Maintained on IV Rocephin.Afebrile, vital signs stable.  Denies 

abdominal pain. Denies chest pain, palpitations or shortness of breath.








09/03/2019 Maintained on PPN secondary to questionable aspiration.  Swallow 

evaluation pending.  Continues on IV Rocephin, Flagyl.  BUN 11, creatinine 0.77 

.Scheduled for cholecystectomy today.  Denies nausea, vomiting, diarrhea.  

Denies abdominal pain.  Denies chest pain, palpitations or shortness of breath. 

Systolic blood pressure ranging in the high 90s, currently.  Denies 

lightheadedness, dizziness or focal deficits.  Afebrile, normal WBC.








09/04/2019  status post robotic cholecystectomy with perforated gallbladder.  

Tolerated procedure well.  Afebrile, WBC elevated at 16.  Mildly elevated AST / 

ALT. Pain controlled.  PPN weaned off, passed swallow evaluation as per speech 

therapy.  Good diet intake with no nausea vomiting or diarrhea.  Elevated blood 

sugars into the low 200s.  Passing flatus, no bowel movement. 





Significant clinical improvement.  Discharge antibiotics adjusted as per 

infectious disease.  Cleared by all consults for discharge.  Patient is being 

discharged to [facility] subacute rehab in a stable condition with guarded 

prognosis.














EXAM:





GENERAL: ALert & Oriented X 2, no acute distress


CARDIOVASCULAR:  S1, S2 regular.systolic murmur


RESPIRATION: Breath sounds diminished in the bases. No rhonchi , crackles, or 

wheezing.


ABDOMEN:  Soft,  status post surgery.  No guarding. no masses palpable.  

Positive Bowel sounds.


NERVOUS SYSTEM:  Cranial N 2-12 grossly intact. Moves all 4 limbs.  Diffuse 

weakness, No focal deficits.








The impression and plan of care has been dictated as directed.





:


I performed a history and examination of this patient,  discussed the same with 

the dictator.  I agree with the dictator's note ,documented as a scribe.  Any 

additional findings or plans will be noted.











Time taken: 35 minutes


Patient Condition at Discharge: Stable





Plan - Discharge Summary


Discharge Rx Participant: No


New Discharge Prescriptions: 


New


   metroNIDAZOLE [Flagyl] 500 mg PO TID #30 tab


   Pantoprazole Sodium [Protonix] 40 mg PO DAILY #30 tablet.


   Acetaminophen Tab [Tylenol] 650 mg PO Q6HR PRN  tab


     PRN Reason: Mild Pain Or Fever >= 100.5


   Cefuroxime Axetil [Ceftin] 500 mg PO BID #20 tab





Continue


   Levothyroxine Sodium [Synthroid] 25 mcg PO DAILY


   Donepezil [Aricept] 10 mg PO DAILY


   Atorvastatin [Lipitor] 80 mg PO HS #30 tab


   Enalapril [Vasotec] 5 mg PO BID #60 tab


   Metoprolol Tartrate [Lopressor] 25 mg PO BID #60 tab


   Nitroglycerin Sl Tabs [Nitrostat] 0.4 mg SUBLINGUAL Q5M PRN #25 tab


     PRN Reason: Chest Pain


   Cholecalciferol [Vitamin D3 (25 Mcg = 1000 Iu)] 1,000 unit PO DAILY


   Aspirin EC [Ecotrin Low Dose] 81 mg PO DAILY


   Diclofenac Sodium [Voltaren] 50 mg PO DAILY


   Memantine [Namenda] 10 mg PO BID


Discharge Medication List





Donepezil [Aricept] 10 mg PO DAILY 08/28/14 [History]


Levothyroxine Sodium [Synthroid] 25 mcg PO DAILY 08/28/14 [History]


Atorvastatin [Lipitor] 80 mg PO HS #30 tab 09/04/14 [Rx]


Enalapril [Vasotec] 5 mg PO BID #60 tab 09/04/14 [Rx]


Metoprolol Tartrate [Lopressor] 25 mg PO BID #60 tab 09/04/14 [Rx]


Nitroglycerin Sl Tabs [Nitrostat] 0.4 mg SUBLINGUAL Q5M PRN #25 tab 09/04/14 

[Rx]


Aspirin EC [Ecotrin Low Dose] 81 mg PO DAILY 12/26/16 [History]


Cholecalciferol [Vitamin D3 (25 Mcg = 1000 Iu)] 1,000 unit PO DAILY 12/26/16 

[History]


Diclofenac Sodium [Voltaren] 50 mg PO DAILY 12/26/16 [History]


Memantine [Namenda] 10 mg PO BID 08/28/19 [History]


metroNIDAZOLE [Flagyl] 500 mg PO TID #30 tab 09/04/19 [Rx]


Acetaminophen Tab [Tylenol] 650 mg PO Q6HR PRN  tab 09/05/19 [Rx]


Cefuroxime Axetil [Ceftin] 500 mg PO BID #20 tab 09/05/19 [Rx]


Pantoprazole Sodium [Protonix] 40 mg PO DAILY #30 tablet. 09/05/19 [Rx]








Follow up Appointment(s)/Referral(s): 


Cristiano Gibson MD [STAFF PHYSICIAN] - 09/23/19 2:00 pm (Appt set with Crystal)


Geremias Quiñones MD [Primary Care Provider] - 09/09/19 10:00 am


Colleen Moser MD [STAFF PHYSICIAN] - 09/10/19 3:40 pm


()


[facility], [NON-STAFF] - As Needed


[facility], [NON-STAFF] - As Needed


Patient Instructions/Handouts:  Cholecystitis (GEN), Low Fat Diet (DC), 

Laparoscopic Cholecystectomy (DC)


Activity/Diet/Wound Care/Special Instructions: 


[facility].   Final ANtibx Rec. pending from ID








May shower. No bath tub soaks. 











CBC, BMP in 3 days








DIet: COnsist. carb/low fat


Discharge Disposition: TRANSFER TO SNF/ECF

## 2019-09-05 NOTE — PN
PROGRESS NOTE



DATE OF SERVICE:

09/06/2019



REASON FOR FOLLOWUP:

Acute cholecystitis and leukocytosis.



INTERVAL HISTORY:

The patient did have a low-grade fever this morning of 99.7 degrees.  She has been

afebrile since then.  The patient has been breathing comfortably.  Denies having any

chest pain or any cough.  No abdominal pain.  No nausea, vomiting.  No diarrhea.

Currently waiting for insurance approval for discharge to rehab.



PHYSICAL EXAMINATION:

Blood pressure 112/65, pulse of 65, temperature 99.4. She is 97% on room air.

General description is an elderly female up in the chair in no distress.

RESPIRATORY SYSTEM: Unlabored breathing with decreased breath sounds at the base.

HEART: S1, S2.  Regular rate and rhythm.

ABDOMEN: Soft. No tenderness.



LABS:

Hemoglobin is 10.1, white count 13.3, BUN of 17, creatinine 0.94.



DIAGNOSTIC IMPRESSION AND PLAN:

Patient with acute cholecystitis, perforated, status post laparoscopic cholecystectomy.

Patient is currently covered with Rocephin and Flagyl. White count has shown a downward

trend. Plan to finish therapy with oral Ceftin and Flagyl combination for another 10

days.  Continue with supportive care.





MMODL / IJN: [id number] / Job#: [id number]

## 2019-09-06 VITALS
DIASTOLIC BLOOD PRESSURE: 65 MMHG | HEART RATE: 64 BPM | TEMPERATURE: 98.3 F | SYSTOLIC BLOOD PRESSURE: 111 MMHG | RESPIRATION RATE: 18 BRPM

## 2019-09-06 LAB
ALBUMIN SERPL-MCNC: 2.8 G/DL (ref 3.5–5)
ALP SERPL-CCNC: 109 U/L (ref 38–126)
ALT SERPL-CCNC: 57 U/L (ref 9–52)
ANION GAP SERPL CALC-SCNC: 9 MMOL/L
AST SERPL-CCNC: 36 U/L (ref 14–36)
BASOPHILS # BLD AUTO: 0.1 K/UL (ref 0–0.2)
BASOPHILS NFR BLD AUTO: 1 %
BUN SERPL-SCNC: 14 MG/DL (ref 7–17)
CALCIUM SPEC-MCNC: 8.2 MG/DL (ref 8.4–10.2)
CHLORIDE SERPL-SCNC: 110 MMOL/L (ref 98–107)
CO2 SERPL-SCNC: 24 MMOL/L (ref 22–30)
EOSINOPHIL # BLD AUTO: 0.4 K/UL (ref 0–0.7)
EOSINOPHIL NFR BLD AUTO: 4 %
ERYTHROCYTE [DISTWIDTH] IN BLOOD BY AUTOMATED COUNT: 3.82 M/UL (ref 3.8–5.4)
ERYTHROCYTE [DISTWIDTH] IN BLOOD: 16 % (ref 11.5–15.5)
GLUCOSE BLD-MCNC: 133 MG/DL (ref 75–99)
GLUCOSE BLD-MCNC: 164 MG/DL (ref 75–99)
GLUCOSE SERPL-MCNC: 124 MG/DL (ref 74–99)
HCT VFR BLD AUTO: 31.6 % (ref 34–46)
HGB BLD-MCNC: 10.3 GM/DL (ref 11.4–16)
LYMPHOCYTES # SPEC AUTO: 1.8 K/UL (ref 1–4.8)
LYMPHOCYTES NFR SPEC AUTO: 20 %
MAGNESIUM SPEC-SCNC: 1.9 MG/DL (ref 1.6–2.3)
MCH RBC QN AUTO: 27 PG (ref 25–35)
MCHC RBC AUTO-ENTMCNC: 32.7 G/DL (ref 31–37)
MCV RBC AUTO: 82.6 FL (ref 80–100)
MONOCYTES # BLD AUTO: 0.7 K/UL (ref 0–1)
MONOCYTES NFR BLD AUTO: 8 %
NEUTROPHILS # BLD AUTO: 6 K/UL (ref 1.3–7.7)
NEUTROPHILS NFR BLD AUTO: 66 %
PLATELET # BLD AUTO: 217 K/UL (ref 150–450)
POTASSIUM SERPL-SCNC: 4 MMOL/L (ref 3.5–5.1)
PROT SERPL-MCNC: 5.2 G/DL (ref 6.3–8.2)
SODIUM SERPL-SCNC: 143 MMOL/L (ref 137–145)
WBC # BLD AUTO: 9.1 K/UL (ref 3.8–10.6)

## 2019-09-06 RX ADMIN — MEMANTINE HYDROCHLORIDE SCH MG: 10 TABLET ORAL at 08:59

## 2019-09-06 RX ADMIN — Medication SCH UNIT: at 08:59

## 2019-09-06 RX ADMIN — HEPARIN SODIUM SCH UNIT: 5000 INJECTION, SOLUTION INTRAVENOUS; SUBCUTANEOUS at 08:59

## 2019-09-06 RX ADMIN — INSULIN ASPART SCH UNIT: 100 INJECTION, SOLUTION INTRAVENOUS; SUBCUTANEOUS at 11:47

## 2019-09-06 RX ADMIN — PANTOPRAZOLE SODIUM SCH: 40 INJECTION, POWDER, FOR SOLUTION INTRAVENOUS at 08:50

## 2019-09-06 RX ADMIN — METRONIDAZOLE SCH: 500 INJECTION, SOLUTION INTRAVENOUS at 11:21

## 2019-09-06 RX ADMIN — DONEPEZIL HYDROCHLORIDE SCH MG: 10 TABLET ORAL at 08:59

## 2019-09-06 RX ADMIN — METRONIDAZOLE SCH MLS/HR: 500 INJECTION, SOLUTION INTRAVENOUS at 04:04

## 2019-09-06 RX ADMIN — LEVOTHYROXINE SODIUM SCH MCG: 25 TABLET ORAL at 06:14

## 2019-09-06 RX ADMIN — LISINOPRIL SCH MG: 10 TABLET ORAL at 08:59

## 2019-09-06 RX ADMIN — INSULIN ASPART SCH: 100 INJECTION, SOLUTION INTRAVENOUS; SUBCUTANEOUS at 07:08

## 2019-09-06 RX ADMIN — METOPROLOL TARTRATE SCH MG: 25 TABLET, FILM COATED ORAL at 08:59

## 2019-09-06 RX ADMIN — POTASSIUM CHLORIDE SCH: 14.9 INJECTION, SOLUTION INTRAVENOUS at 05:38

## 2019-09-06 RX ADMIN — ASPIRIN 81 MG CHEWABLE TABLET SCH MG: 81 TABLET CHEWABLE at 08:59

## 2019-09-06 NOTE — PN
PROGRESS NOTE



DATE OF SERVICE:

09/06/2019.



REASON FOR FOLLOWUP:

Acute cholecystitis, perforated.



INTERVAL HISTORY:

The patient was seen on rounds early this afternoon.  The patient has been afebrile.

She has been breathing comfortably.  She was complaining of some difficulty swallowing,

though, but no nausea, no vomiting, no abdominal pain and no diarrhea.



PHYSICAL EXAMINATION:

Her blood pressure is 111/65 with a pulse of 64, temperature 98.3. She is 95% on room

air.

General description is an elderly female up in the bed in no distress.

RESPIRATORY SYSTEM: Unlabored breathing. Clear to auscultation anteriorly.

HEART: S1, S2.  Regular rate and rhythm.

ABDOMEN: Soft. No tenderness.



LABS:

Hemoglobin is 10.3, white count 9.1.  BUN of 14, creatinine 0.87.



DIAGNOSTIC IMPRESSION AND PLAN:

Patient with acute perforated colon, status post laparoscopic cholecystectomy.

Patient's white count has normalized.  She did well on Rocephin and Flagyl; hence

recommend Ceftin and Flagyl for 7 to 10 days on discharge.  Prescription was provided

for the patient.  Continue with supportive care.





MMODL / IJN: [id number] / Job#: [id number]

## 2019-10-09 ENCOUNTER — HOSPITAL ENCOUNTER (OUTPATIENT)
Dept: HOSPITAL 47 - LABWHC1 | Age: 84
End: 2019-10-09
Attending: NURSE PRACTITIONER
Payer: MEDICARE

## 2019-10-09 DIAGNOSIS — I10: ICD-10-CM

## 2019-10-09 DIAGNOSIS — E78.2: Primary | ICD-10-CM

## 2019-10-09 LAB
ALBUMIN SERPL-MCNC: 4 G/DL (ref 3.8–4.9)
ALBUMIN/GLOB SERPL: 2.35 G/DL (ref 1.6–3.17)
ALP SERPL-CCNC: 100 U/L (ref 41–126)
ALT SERPL-CCNC: 23 U/L (ref 8–44)
ANION GAP SERPL CALC-SCNC: 9.6 MMOL/L (ref 4–12)
AST SERPL-CCNC: 24 U/L (ref 13–35)
BUN SERPL-SCNC: 18 MG/DL (ref 9–27)
BUN/CREAT SERPL: 20 RATIO (ref 12–20)
CALCIUM SPEC-MCNC: 8.7 MG/DL (ref 8.7–10.3)
CHLORIDE SERPL-SCNC: 108 MMOL/L (ref 96–109)
CHOLEST SERPL-MCNC: 136 MG/DL (ref 0–200)
CO2 SERPL-SCNC: 26.4 MMOL/L (ref 21.6–31.8)
GLOBULIN SER CALC-MCNC: 1.7 G/DL (ref 1.6–3.3)
GLUCOSE SERPL-MCNC: 119 MG/DL (ref 70–110)
HDLC SERPL-MCNC: 54 MG/DL (ref 40–60)
LDLC SERPL CALC-MCNC: 43.2 MG/DL (ref 0–131)
POTASSIUM SERPL-SCNC: 4.9 MMOL/L (ref 3.5–5.5)
PROT SERPL-MCNC: 5.7 G/DL (ref 6.2–8.2)
SODIUM SERPL-SCNC: 144 MMOL/L (ref 135–145)
TRIGL SERPL-MCNC: 194 MG/DL (ref 0–149)
VLDLC SERPL CALC-MCNC: 38.8 MG/DL (ref 5–40)

## 2019-10-09 PROCEDURE — 80053 COMPREHEN METABOLIC PANEL: CPT

## 2019-10-09 PROCEDURE — 36415 COLL VENOUS BLD VENIPUNCTURE: CPT

## 2019-10-09 PROCEDURE — 80061 LIPID PANEL: CPT

## 2020-08-04 ENCOUNTER — HOSPITAL ENCOUNTER (OUTPATIENT)
Dept: HOSPITAL 47 - LABWHC1 | Age: 85
Discharge: HOME | End: 2020-08-04
Attending: INTERNAL MEDICINE
Payer: MEDICARE

## 2020-08-04 DIAGNOSIS — E78.2: Primary | ICD-10-CM

## 2020-08-04 LAB
ALBUMIN SERPL-MCNC: 4.2 G/DL (ref 3.8–4.9)
ALBUMIN/GLOB SERPL: 1.91 G/DL (ref 1.6–3.17)
ALP SERPL-CCNC: 115 U/L (ref 41–126)
ALT SERPL-CCNC: 16 U/L (ref 8–44)
ANION GAP SERPL CALC-SCNC: 11.3 MMOL/L (ref 4–12)
AST SERPL-CCNC: 20 U/L (ref 13–35)
BUN SERPL-SCNC: 17 MG/DL (ref 9–27)
BUN/CREAT SERPL: 17 RATIO (ref 12–20)
CALCIUM SPEC-MCNC: 9.3 MG/DL (ref 8.7–10.3)
CHLORIDE SERPL-SCNC: 106 MMOL/L (ref 96–109)
CHOLEST SERPL-MCNC: 139 MG/DL (ref 0–200)
CO2 SERPL-SCNC: 29.7 MMOL/L (ref 21.6–31.8)
GLOBULIN SER CALC-MCNC: 2.2 G/DL (ref 1.6–3.3)
GLUCOSE SERPL-MCNC: 155 MG/DL (ref 70–110)
HDLC SERPL-MCNC: 60 MG/DL (ref 40–60)
LDLC SERPL CALC-MCNC: 36.2 MG/DL (ref 0–131)
POTASSIUM SERPL-SCNC: 4.2 MMOL/L (ref 3.5–5.5)
PROT SERPL-MCNC: 6.4 G/DL (ref 6.2–8.2)
SODIUM SERPL-SCNC: 147 MMOL/L (ref 135–145)
TRIGL SERPL-MCNC: 214 MG/DL (ref 0–149)
VLDLC SERPL CALC-MCNC: 42.8 MG/DL (ref 5–40)

## 2020-08-04 PROCEDURE — 80053 COMPREHEN METABOLIC PANEL: CPT

## 2020-08-04 PROCEDURE — 80061 LIPID PANEL: CPT

## 2020-08-04 PROCEDURE — 36415 COLL VENOUS BLD VENIPUNCTURE: CPT

## 2021-03-28 ENCOUNTER — HOSPITAL ENCOUNTER (EMERGENCY)
Dept: HOSPITAL 47 - EC | Age: 86
Discharge: HOME | End: 2021-03-28
Payer: MEDICARE

## 2021-03-28 VITALS — RESPIRATION RATE: 16 BRPM

## 2021-03-28 VITALS — DIASTOLIC BLOOD PRESSURE: 59 MMHG | SYSTOLIC BLOOD PRESSURE: 123 MMHG | HEART RATE: 68 BPM

## 2021-03-28 VITALS — TEMPERATURE: 97.4 F

## 2021-03-28 DIAGNOSIS — Z79.82: ICD-10-CM

## 2021-03-28 DIAGNOSIS — Z79.1: ICD-10-CM

## 2021-03-28 DIAGNOSIS — I10: ICD-10-CM

## 2021-03-28 DIAGNOSIS — U07.1: Primary | ICD-10-CM

## 2021-03-28 DIAGNOSIS — E78.5: ICD-10-CM

## 2021-03-28 DIAGNOSIS — F02.80: ICD-10-CM

## 2021-03-28 DIAGNOSIS — G30.9: ICD-10-CM

## 2021-03-28 DIAGNOSIS — M19.90: ICD-10-CM

## 2021-03-28 PROCEDURE — 96374 THER/PROPH/DIAG INJ IV PUSH: CPT

## 2021-03-28 PROCEDURE — 71045 X-RAY EXAM CHEST 1 VIEW: CPT

## 2021-03-28 PROCEDURE — 99284 EMERGENCY DEPT VISIT MOD MDM: CPT

## 2021-03-28 PROCEDURE — 87635 SARS-COV-2 COVID-19 AMP PRB: CPT

## 2021-03-28 NOTE — XR
EXAMINATION TYPE: XR chest 1V portable

 

DATE OF EXAM: 3/28/2021

 

COMPARISON: 8/29/2019

 

HISTORY: Fever and shortness of breath

 

TECHNIQUE: Single frontal view of the chest is obtained.

 

FINDINGS:  There is moderate diffuse interstitial opacities. There is mild involvement of the left jomar
ng base. No significant pleural effusion, or pneumothorax seen.  The cardiac silhouette size is withi
n normal limits.   The osseous structures are intact.

 

IMPRESSION:  Interstitial opacities, concerning for infiltrates.

## 2021-03-28 NOTE — ED
General Adult HPI





- General


Chief complaint: Fever


Stated complaint: Fever


Time Seen by Provider: 21 17:16


Source: EMS


Mode of arrival: EMS


Limitations: altered mental status





- History of Present Illness


Initial comments: 


Dictation was produced using dragon dictation software. please excuse any 

grammatical, word or spelling errors. 





This patient was cared for during a federal and state declared state of 

emergency secondary to Covid 19





Chief Complaint: 85-year-old female accompanied by  presents emergency 

department for Covid 19 testing





History of Present Illness: Is 85-year-old female she has severe dementia, 

dyslipidemia hypertension.  Patient unable to provide history of present illness

at this time.  HPI obtained from  and patient's son over the phone.  

According to family members patient was exposed to Covid 19 from family members 

who tested positive.  According to  patient has been having a mild cough 

and low-grade fever.








The ROS documented in this emergency department record has been reviewed and 

confirmed by me.  Those systems with pertinent positive or negative responses 

have been documented in the HPI.  All other systems are other negative and/or 

noncontributory.








PHYSICAL EXAM:


General Impression: Alert, not in acute distress


HEENT: Normocephalic atraumatic, extra-ocular movements intact, pupils equal and

reactive to light bilaterally, mucous membranes moist.


Cardiovascular: Heart regular rate and rhythm


Chest: Able to complete full sentences, no retractions, no tachypnea


Abdomen: abdomen soft, non-tender, non-distended, no organomegaly


Musculoskeletal: Pulses present and equal in all extremities, no peripheral 

edema


Motor:  no focal deficits noted


Neurological: CN II-XII grossly intact, no focal motor or sensory deficits noted


Skin: Intact with no visualized rashes


Psych: Normal affect and mood





ED course: 85 y Old female presents accompanied by  [place] Covid 19 

testing.  Vital signs upon arrival shows 91% on room air, rest of vital signs 

within acceptable limits.





Chest x-ray shows infiltrates. covid 19 test is positive.  Patient is not 

hypoxic.  She meets criteria for monoclonal antibody infusion.


Patient tolerated infusion.  No adverse outcomes.  Observed in emergency 

department for one hour after the infusion.  Return parameters is discussed.  

Patient be discharged.








- Related Data


                                Home Medications











 Medication  Instructions  Recorded  Confirmed


 


Donepezil [Aricept] 10 mg PO DAILY 14


 


Levothyroxine Sodium [Synthroid] 25 mcg PO DAILY 14


 


Aspirin EC [Ecotrin Low Dose] 81 mg PO DAILY 16


 


Cholecalciferol [Vitamin D3 (25 1,000 unit PO DAILY 16





Mcg = 1000 Iu)]   


 


Diclofenac Sodium [Voltaren] 50 mg PO DAILY 16


 


Memantine [Namenda] 10 mg PO BID 19








                                  Previous Rx's











 Medication  Instructions  Recorded


 


Atorvastatin [Lipitor] 80 mg PO HS #30 tab 14


 


Enalapril [Vasotec] 5 mg PO BID #60 tab 14


 


Metoprolol Tartrate [Lopressor] 25 mg PO BID #60 tab 14


 


Nitroglycerin Sl Tabs [Nitrostat] 0.4 mg SUBLINGUAL Q5M PRN #25 tab 14


 


metroNIDAZOLE [Flagyl] 500 mg PO TID #30 tab 19


 


Acetaminophen Tab [Tylenol] 650 mg PO Q6HR PRN  tab 19


 


Cefuroxime Axetil [Ceftin] 500 mg PO BID #20 tab 19


 


Pantoprazole Sodium [Protonix] 40 mg PO DAILY #30 tablet. 19











                                    Allergies











Allergy/AdvReac Type Severity Reaction Status Date / Time


 


Penicillins Allergy  Unknown Verified 21 17:21


 


propoxyphene HCl Allergy  Confusion Verified 21 17:21





[From [facility]]     














Review of Systems


ROS Statement: 


Those systems with pertinent positive or pertinent negative responses have been 

documented in the HPI.





ROS Other: All systems not noted in ROS Statement are negative.





Past Medical History


Past Medical History: Dementia, Hyperlipidemia, Hypertension, Osteoarthritis 

(OA), Thyroid Disorder


Additional Past Medical History / Comment(s): ALZHEIMER'S


History of Any Multi-Drug Resistant Organisms: None Reported


Date of last positivie culture/infection: 19


MDRO Source:: ESBL URINE


Past Surgical History: Heart Catheterization With Stent, Tonsillectomy


Additional Past Surgical History / Comment(s): cataract surgery, colonoscopy,


Past Anesthesia/Blood Transfusion Reactions: No Reported Reaction


Date of Last Stent Placement:: 


Past Psychological History: No Psychological Hx Reported


Past Alcohol Use History: None Reported


Past Drug Use History: None Reported





- Past Family History


  ** Mother


Family Medical History: Asthma





  ** Father


Family Medical History: Deep Vein Thrombosis (DVT), Myocardial Infarction (MI)


Additional Family Medical History / Comment(s):  at 59





  ** Sister(s)


Family Medical History: Cancer


Additional Family Medical History / Comment(s): 2 SISTERS HAD CANCER





General Exam


Limitations: altered mental status





Course


                                   Vital Signs











  21





  17:13 18:21 18:23


 


Temperature 98.8 F  97.9 F


 


Pulse Rate 55 L  64


 


Respiratory 16 16 16





Rate   


 


Blood Pressure 117/43  


 


O2 Sat by Pulse 91 L  93 L





Oximetry   














  21





  20:30 21:45


 


Temperature 97.4 F L 


 


Pulse Rate 65 68


 


Respiratory 16 16





Rate  


 


Blood Pressure 128/44 123/59


 


O2 Sat by Pulse 92 L 93 L





Oximetry  














Medical Decision Making





- Lab Data


                                   Lab Results











  21 Range/Units





  17:53 


 


Coronavirus (PCR)  Detected A  (Not Detectd)  














Disposition


Clinical Impression: 


 COVID-19





Disposition: HOME SELF-CARE


Condition: Fair


Instructions (If sedation given, give patient instructions):  Viral Pneumonia 

(ED)


Additional Instructions: 


Today you were evaluated for symptoms consistent with upper respiratory 

infection.  Today you tested positive for Covid 19.  Your are stable for 

discharge, however it is instructed to to seek immediate medical attention 

especially if you develop worsening symptoms especially respiratory distress.  

If possible, try to obtain a pulse oximeter and monitor your oxygen at home.  In

the meantime please remain in quarantine for 14 days.  For any other questions 

please contact [facility] for here in emergency department or [facility] at [phone]


Is patient prescribed a controlled substance at d/c from ED?: No


Referrals: 


Geremias Quiñones MD [Primary Care Provider] - 1-2 days


Time of Disposition: 21:54